# Patient Record
Sex: MALE | Race: WHITE | NOT HISPANIC OR LATINO
[De-identification: names, ages, dates, MRNs, and addresses within clinical notes are randomized per-mention and may not be internally consistent; named-entity substitution may affect disease eponyms.]

---

## 2018-01-19 PROBLEM — Z00.00 ENCOUNTER FOR PREVENTIVE HEALTH EXAMINATION: Status: ACTIVE | Noted: 2018-01-19

## 2018-01-22 ENCOUNTER — APPOINTMENT (OUTPATIENT)
Dept: PULMONOLOGY | Facility: CLINIC | Age: 78
End: 2018-01-22
Payer: MEDICARE

## 2018-01-22 VITALS
TEMPERATURE: 98.7 F | RESPIRATION RATE: 16 BRPM | HEART RATE: 86 BPM | BODY MASS INDEX: 32.89 KG/M2 | WEIGHT: 217 LBS | OXYGEN SATURATION: 90 % | HEIGHT: 68 IN | SYSTOLIC BLOOD PRESSURE: 164 MMHG | DIASTOLIC BLOOD PRESSURE: 62 MMHG

## 2018-01-22 PROCEDURE — 94060 EVALUATION OF WHEEZING: CPT

## 2018-01-22 PROCEDURE — 99204 OFFICE O/P NEW MOD 45 MIN: CPT | Mod: 25

## 2018-01-22 PROCEDURE — 94729 DIFFUSING CAPACITY: CPT

## 2018-01-22 PROCEDURE — 94727 GAS DIL/WSHOT DETER LNG VOL: CPT

## 2018-01-25 ENCOUNTER — FORM ENCOUNTER (OUTPATIENT)
Age: 78
End: 2018-01-25

## 2018-01-26 ENCOUNTER — OUTPATIENT (OUTPATIENT)
Dept: OUTPATIENT SERVICES | Facility: HOSPITAL | Age: 78
LOS: 1 days | End: 2018-01-26
Payer: MEDICARE

## 2018-01-26 ENCOUNTER — APPOINTMENT (OUTPATIENT)
Dept: RADIOLOGY | Facility: CLINIC | Age: 78
End: 2018-01-26
Payer: MEDICARE

## 2018-01-26 DIAGNOSIS — R06.02 SHORTNESS OF BREATH: ICD-10-CM

## 2018-01-26 DIAGNOSIS — Z00.8 ENCOUNTER FOR OTHER GENERAL EXAMINATION: ICD-10-CM

## 2018-01-26 PROCEDURE — 71046 X-RAY EXAM CHEST 2 VIEWS: CPT

## 2018-01-26 PROCEDURE — 71046 X-RAY EXAM CHEST 2 VIEWS: CPT | Mod: 26

## 2018-02-26 ENCOUNTER — APPOINTMENT (OUTPATIENT)
Dept: PULMONOLOGY | Facility: CLINIC | Age: 78
End: 2018-02-26
Payer: MEDICARE

## 2018-02-26 VITALS
TEMPERATURE: 97.7 F | BODY MASS INDEX: 33.04 KG/M2 | OXYGEN SATURATION: 89 % | RESPIRATION RATE: 16 BRPM | DIASTOLIC BLOOD PRESSURE: 58 MMHG | WEIGHT: 218 LBS | HEIGHT: 68 IN | HEART RATE: 75 BPM | SYSTOLIC BLOOD PRESSURE: 142 MMHG

## 2018-02-26 PROCEDURE — 99214 OFFICE O/P EST MOD 30 MIN: CPT

## 2018-02-26 RX ORDER — GLIMEPIRIDE 4 MG/1
4 TABLET ORAL
Refills: 0 | Status: ACTIVE | COMMUNITY

## 2018-02-26 RX ORDER — FENOFIBRATE 145 MG/1
145 TABLET, COATED ORAL
Refills: 0 | Status: ACTIVE | COMMUNITY

## 2018-02-26 RX ORDER — METFORMIN HYDROCHLORIDE 500 MG/1
500 TABLET, COATED ORAL
Refills: 0 | Status: ACTIVE | COMMUNITY

## 2018-02-26 RX ORDER — PIOGLITAZONE HYDROCHLORIDE 30 MG/1
30 TABLET ORAL
Refills: 0 | Status: ACTIVE | COMMUNITY

## 2018-02-26 RX ORDER — ALBUTEROL SULFATE 90 UG/1
108 (90 BASE) POWDER, METERED RESPIRATORY (INHALATION)
Refills: 0 | Status: ACTIVE | COMMUNITY

## 2018-02-26 RX ORDER — METOPROLOL SUCCINATE 50 MG/1
50 TABLET, EXTENDED RELEASE ORAL
Refills: 0 | Status: ACTIVE | COMMUNITY

## 2018-07-30 ENCOUNTER — APPOINTMENT (OUTPATIENT)
Dept: PULMONOLOGY | Facility: CLINIC | Age: 78
End: 2018-07-30
Payer: MEDICARE

## 2018-07-30 VITALS
TEMPERATURE: 97.8 F | OXYGEN SATURATION: 83 % | SYSTOLIC BLOOD PRESSURE: 160 MMHG | BODY MASS INDEX: 32.54 KG/M2 | DIASTOLIC BLOOD PRESSURE: 60 MMHG | HEART RATE: 85 BPM | WEIGHT: 214 LBS

## 2018-07-30 VITALS — OXYGEN SATURATION: 92 %

## 2018-07-30 DIAGNOSIS — Z87.891 PERSONAL HISTORY OF NICOTINE DEPENDENCE: ICD-10-CM

## 2018-07-30 DIAGNOSIS — Z86.79 PERSONAL HISTORY OF OTHER DISEASES OF THE CIRCULATORY SYSTEM: ICD-10-CM

## 2018-07-30 DIAGNOSIS — Z86.39 PERSONAL HISTORY OF OTHER ENDOCRINE, NUTRITIONAL AND METABOLIC DISEASE: ICD-10-CM

## 2018-07-30 PROCEDURE — 94727 GAS DIL/WSHOT DETER LNG VOL: CPT

## 2018-07-30 PROCEDURE — 94729 DIFFUSING CAPACITY: CPT

## 2018-07-30 PROCEDURE — 99214 OFFICE O/P EST MOD 30 MIN: CPT | Mod: 25

## 2018-07-30 PROCEDURE — 94060 EVALUATION OF WHEEZING: CPT

## 2018-07-30 RX ORDER — FOLIC ACID 1 MG/1
1 TABLET ORAL
Refills: 0 | Status: ACTIVE | COMMUNITY

## 2018-07-30 RX ORDER — AMLODIPINE BESYLATE 5 MG/1
TABLET ORAL
Refills: 0 | Status: ACTIVE | COMMUNITY

## 2018-07-30 RX ORDER — LISINOPRIL 20 MG/1
20 TABLET ORAL
Refills: 0 | Status: COMPLETED | COMMUNITY
End: 2018-07-30

## 2018-10-29 ENCOUNTER — APPOINTMENT (OUTPATIENT)
Dept: PULMONOLOGY | Facility: CLINIC | Age: 78
End: 2018-10-29
Payer: MEDICARE

## 2018-10-29 VITALS
TEMPERATURE: 98.2 F | WEIGHT: 215 LBS | BODY MASS INDEX: 32.69 KG/M2 | OXYGEN SATURATION: 78 % | SYSTOLIC BLOOD PRESSURE: 168 MMHG | HEART RATE: 87 BPM | DIASTOLIC BLOOD PRESSURE: 68 MMHG

## 2018-10-29 VITALS — OXYGEN SATURATION: 90 % | HEART RATE: 79 BPM

## 2018-10-29 PROCEDURE — 99215 OFFICE O/P EST HI 40 MIN: CPT

## 2018-11-04 ENCOUNTER — FORM ENCOUNTER (OUTPATIENT)
Age: 78
End: 2018-11-04

## 2018-11-05 ENCOUNTER — OUTPATIENT (OUTPATIENT)
Dept: OUTPATIENT SERVICES | Facility: HOSPITAL | Age: 78
LOS: 1 days | End: 2018-11-05
Payer: MEDICARE

## 2018-11-05 ENCOUNTER — APPOINTMENT (OUTPATIENT)
Dept: NUCLEAR MEDICINE | Facility: HOSPITAL | Age: 78
End: 2018-11-05
Payer: MEDICARE

## 2018-11-05 DIAGNOSIS — R06.02 SHORTNESS OF BREATH: ICD-10-CM

## 2018-11-05 DIAGNOSIS — R09.02 HYPOXEMIA: ICD-10-CM

## 2018-11-05 PROCEDURE — 78582 LUNG VENTILAT&PERFUS IMAGING: CPT

## 2018-11-05 PROCEDURE — 78582 LUNG VENTILAT&PERFUS IMAGING: CPT | Mod: 26

## 2018-11-05 PROCEDURE — A9567: CPT

## 2018-11-05 PROCEDURE — A9540: CPT

## 2018-11-05 PROCEDURE — 71046 X-RAY EXAM CHEST 2 VIEWS: CPT | Mod: 26

## 2018-11-05 PROCEDURE — 71046 X-RAY EXAM CHEST 2 VIEWS: CPT

## 2018-11-14 ENCOUNTER — OUTPATIENT (OUTPATIENT)
Dept: OUTPATIENT SERVICES | Facility: HOSPITAL | Age: 78
LOS: 1 days | Discharge: HOME | End: 2018-11-14

## 2018-11-14 DIAGNOSIS — D64.9 ANEMIA, UNSPECIFIED: ICD-10-CM

## 2018-11-19 ENCOUNTER — APPOINTMENT (OUTPATIENT)
Dept: HEMATOLOGY ONCOLOGY | Facility: CLINIC | Age: 78
End: 2018-11-19
Payer: MEDICARE

## 2018-11-19 VITALS
BODY MASS INDEX: 33.37 KG/M2 | TEMPERATURE: 97.6 F | OXYGEN SATURATION: 90 % | SYSTOLIC BLOOD PRESSURE: 175 MMHG | HEIGHT: 67.48 IN | RESPIRATION RATE: 16 BRPM | DIASTOLIC BLOOD PRESSURE: 72 MMHG | WEIGHT: 215.15 LBS | HEART RATE: 86 BPM

## 2018-11-19 PROCEDURE — 99204 OFFICE O/P NEW MOD 45 MIN: CPT

## 2018-11-19 RX ORDER — MAGNESIUM OXIDE 400 MG
1000 CAPSULE ORAL DAILY
Qty: 30 | Refills: 1 | Status: ACTIVE | COMMUNITY
Start: 2018-11-19 | End: 1900-01-01

## 2018-11-19 NOTE — CONSULT LETTER
[Dear  ___] : Dear  [unfilled], [Consult Letter:] : I had the pleasure of evaluating your patient, [unfilled]. [Please see my note below.] : Please see my note below. [Consult Closing:] : Thank you very much for allowing me to participate in the care of this patient.  If you have any questions, please do not hesitate to contact me. [Sincerely,] : Sincerely, [FreeTextEntry3] : Florencia Flores MD. MS. \par Hematologist/Oncologist\par [facility]\par ph. [phone]\par fx. [phone]\par

## 2018-11-19 NOTE — PHYSICAL EXAM
[Restricted in physically strenuous activity but ambulatory and able to carry out work of a light or sedentary nature] : Status 1- Restricted in physically strenuous activity but ambulatory and able to carry out work of a light or sedentary nature, e.g., light house work, office work [Obese] : obese [Normal] : normal spine exam without palpable tenderness, no kyphosis or scoliosis [de-identified] : soft obese abdomen, no palpable masses.

## 2018-11-19 NOTE — RESULTS/DATA
[FreeTextEntry1] : Labs done 10/23/2018\par Hb 12.1, mcv 101, smear report - hyposegmented neutrophils, wbc 11.2, diff c/w 37% neutrophils, 5% myelocytes, 3% metamyelocytes, ANC 4.16

## 2018-11-19 NOTE — HISTORY OF PRESENT ILLNESS
[de-identified] : 76 yo male w/ hx of diabetes (type2), htn, high cholesterol, CAD s/p ACBG in 1999, COPD on home oxygen referred for macrocytic anemia noted on labs. Pt had incidental finding of hyposegmented neutrophils which was seen on smear of peripheral blood. He was referred for hematology evaluation to r/o MDS. Pt reports no new fatigue or acute changes in baseline functional status. He denies any recent bleeding or easy bruising. No new medication changes.

## 2018-11-19 NOTE — ASSESSMENT
[FreeTextEntry1] : Mild anemia - Hb 12g\par Diff percent was abnl but actual wbc diff #s were wnl\par ANC and ALC were wnl\par occasional hyposegmented neutrophils seen - nonspecific \par DDx includes med effect (metformin?) vs marrow dysplasia.

## 2018-11-19 NOTE — REVIEW OF SYSTEMS
[Negative] : Allergic/Immunologic [Shortness Of Breath] : no shortness of breath [Wheezing] : no wheezing [Cough] : no cough [SOB on Exertion] : shortness of breath during exertion

## 2018-11-26 ENCOUNTER — RESULT REVIEW (OUTPATIENT)
Age: 78
End: 2018-11-26

## 2018-11-26 ENCOUNTER — APPOINTMENT (OUTPATIENT)
Dept: HEMATOLOGY ONCOLOGY | Facility: CLINIC | Age: 78
End: 2018-11-26

## 2018-11-26 ENCOUNTER — OUTPATIENT (OUTPATIENT)
Dept: OUTPATIENT SERVICES | Facility: HOSPITAL | Age: 78
LOS: 1 days | End: 2018-11-26
Payer: MEDICARE

## 2018-11-26 DIAGNOSIS — D64.9 ANEMIA, UNSPECIFIED: ICD-10-CM

## 2018-11-26 LAB
BASOPHILS # BLD AUTO: 0.1 K/UL — SIGNIFICANT CHANGE UP (ref 0–0.2)
BASOPHILS NFR BLD AUTO: 0.5 % — SIGNIFICANT CHANGE UP (ref 0–2)
EOSINOPHIL # BLD AUTO: 0.1 K/UL — SIGNIFICANT CHANGE UP (ref 0–0.5)
EOSINOPHIL NFR BLD AUTO: 0.8 % — SIGNIFICANT CHANGE UP (ref 0–6)
HCT VFR BLD CALC: 36.7 % — LOW (ref 39–50)
HGB BLD-MCNC: 11.7 G/DL — LOW (ref 13–17)
LYMPHOCYTES # BLD AUTO: 23.2 % — SIGNIFICANT CHANGE UP (ref 13–44)
LYMPHOCYTES # BLD AUTO: 3.1 K/UL — SIGNIFICANT CHANGE UP (ref 1–3.3)
MCHC RBC-ENTMCNC: 31.9 G/DL — LOW (ref 32–36)
MCHC RBC-ENTMCNC: 33.2 PG — SIGNIFICANT CHANGE UP (ref 27–34)
MCV RBC AUTO: 104 FL — HIGH (ref 80–100)
MONOCYTES # BLD AUTO: 1.5 K/UL — HIGH (ref 0–0.9)
MONOCYTES NFR BLD AUTO: 11.2 % — SIGNIFICANT CHANGE UP (ref 2–14)
NEUTROPHILS # BLD AUTO: 8.5 K/UL — HIGH (ref 1.8–7.4)
NEUTROPHILS NFR BLD AUTO: 64.3 % — SIGNIFICANT CHANGE UP (ref 43–77)
PLATELET # BLD AUTO: 369 K/UL — SIGNIFICANT CHANGE UP (ref 150–400)
RBC # BLD: 3.52 M/UL — LOW (ref 4.2–5.8)
RBC # FLD: 24.1 % — HIGH (ref 10.3–14.5)
WBC # BLD: 13.2 K/UL — HIGH (ref 3.8–10.5)
WBC # FLD AUTO: 13.2 K/UL — HIGH (ref 3.8–10.5)

## 2018-11-27 LAB
ALBUMIN SERPL ELPH-MCNC: 4.1 G/DL
ALP BLD-CCNC: 58 U/L
ALT SERPL-CCNC: 9 U/L
ANION GAP SERPL CALC-SCNC: 10 MMOL/L
AST SERPL-CCNC: 16 U/L
BILIRUB SERPL-MCNC: 0.4 MG/DL
BUN SERPL-MCNC: 21 MG/DL
CALCIUM SERPL-MCNC: 9.3 MG/DL
CHLORIDE SERPL-SCNC: 104 MMOL/L
CO2 SERPL-SCNC: 24 MMOL/L
CREAT SERPL-MCNC: 1.3 MG/DL
GLUCOSE SERPL-MCNC: 114 MG/DL
POTASSIUM SERPL-SCNC: 5.5 MMOL/L
PROT SERPL-MCNC: 7.3 G/DL
SODIUM SERPL-SCNC: 138 MMOL/L
VIT B12 SERPL-MCNC: 1021 PG/ML

## 2018-11-28 LAB — TM INTERPRETATION: SIGNIFICANT CHANGE UP

## 2018-11-29 LAB — METHYLMALONATE SERPL-SCNC: 257 NMOL/L

## 2018-12-07 ENCOUNTER — APPOINTMENT (OUTPATIENT)
Dept: PULMONOLOGY | Facility: CLINIC | Age: 78
End: 2018-12-07
Payer: MEDICARE

## 2018-12-07 VITALS
HEART RATE: 82 BPM | RESPIRATION RATE: 18 BRPM | BODY MASS INDEX: 33.35 KG/M2 | WEIGHT: 216 LBS | DIASTOLIC BLOOD PRESSURE: 70 MMHG | OXYGEN SATURATION: 90 % | SYSTOLIC BLOOD PRESSURE: 140 MMHG

## 2018-12-07 VITALS — OXYGEN SATURATION: 92 %

## 2018-12-07 DIAGNOSIS — Z99.89 DEPENDENCE ON OTHER ENABLING MACHINES AND DEVICES: ICD-10-CM

## 2018-12-07 PROCEDURE — 99214 OFFICE O/P EST MOD 30 MIN: CPT

## 2018-12-13 ENCOUNTER — APPOINTMENT (OUTPATIENT)
Dept: HEMATOLOGY ONCOLOGY | Facility: CLINIC | Age: 78
End: 2018-12-13
Payer: MEDICARE

## 2018-12-13 ENCOUNTER — RESULT REVIEW (OUTPATIENT)
Age: 78
End: 2018-12-13

## 2018-12-13 ENCOUNTER — OUTPATIENT (OUTPATIENT)
Dept: OUTPATIENT SERVICES | Facility: HOSPITAL | Age: 78
LOS: 1 days | End: 2018-12-13
Payer: MEDICARE

## 2018-12-13 VITALS
DIASTOLIC BLOOD PRESSURE: 74 MMHG | TEMPERATURE: 97.6 F | OXYGEN SATURATION: 87 % | WEIGHT: 217.15 LBS | SYSTOLIC BLOOD PRESSURE: 154 MMHG | RESPIRATION RATE: 16 BRPM | HEART RATE: 63 BPM | BODY MASS INDEX: 33.53 KG/M2

## 2018-12-13 VITALS — OXYGEN SATURATION: 94 %

## 2018-12-13 PROCEDURE — 81121 IDH2 COMMON VARIANTS: CPT

## 2018-12-13 PROCEDURE — 81275 KRAS GENE VARIANTS EXON 2: CPT

## 2018-12-13 PROCEDURE — 85097 BONE MARROW INTERPRETATION: CPT

## 2018-12-13 PROCEDURE — 81272 KIT GENE TARGETED SEQ ANALYS: CPT

## 2018-12-13 PROCEDURE — 81270 JAK2 GENE: CPT

## 2018-12-13 PROCEDURE — 81219 CALR GENE COM VARIANTS: CPT

## 2018-12-13 PROCEDURE — 81310 NPM1 GENE: CPT

## 2018-12-13 PROCEDURE — 81405 MOPATH PROCEDURE LEVEL 6: CPT

## 2018-12-13 PROCEDURE — 81403 MOPATH PROCEDURE LEVEL 4: CPT

## 2018-12-13 PROCEDURE — 88264 CHROMOSOME ANALYSIS 20-25: CPT

## 2018-12-13 PROCEDURE — 87205 SMEAR GRAM STAIN: CPT

## 2018-12-13 PROCEDURE — 88280 CHROMOSOME KARYOTYPE STUDY: CPT

## 2018-12-13 PROCEDURE — 88275 CYTOGENETICS 100-300: CPT

## 2018-12-13 PROCEDURE — 88237 TISSUE CULTURE BONE MARROW: CPT

## 2018-12-13 PROCEDURE — 88313 SPECIAL STAINS GROUP 2: CPT | Mod: 26

## 2018-12-13 PROCEDURE — 81245 FLT3 GENE: CPT

## 2018-12-13 PROCEDURE — 81246 FLT3 GENE ANALYSIS: CPT

## 2018-12-13 PROCEDURE — 81120 IDH1 COMMON VARIANTS: CPT

## 2018-12-13 PROCEDURE — 88185 FLOWCYTOMETRY/TC ADD-ON: CPT

## 2018-12-13 PROCEDURE — 38222 DX BONE MARROW BX & ASPIR: CPT | Mod: RT

## 2018-12-13 PROCEDURE — 81402 MOPATH PROCEDURE LEVEL 3: CPT

## 2018-12-13 PROCEDURE — 88313 SPECIAL STAINS GROUP 2: CPT

## 2018-12-13 PROCEDURE — 81210 BRAF GENE: CPT

## 2018-12-13 PROCEDURE — 81170 ABL1 GENE: CPT

## 2018-12-13 PROCEDURE — 88189 FLOWCYTOMETRY/READ 16 & >: CPT

## 2018-12-13 PROCEDURE — 88271 CYTOGENETICS DNA PROBE: CPT

## 2018-12-13 PROCEDURE — 81311 NRAS GENE VARIANTS EXON 2&3: CPT

## 2018-12-13 PROCEDURE — 88305 TISSUE EXAM BY PATHOLOGIST: CPT | Mod: 26

## 2018-12-13 PROCEDURE — 81276 KRAS GENE ADDL VARIANTS: CPT

## 2018-12-13 PROCEDURE — 88305 TISSUE EXAM BY PATHOLOGIST: CPT

## 2018-12-13 PROCEDURE — 88184 FLOWCYTOMETRY/ TC 1 MARKER: CPT

## 2018-12-14 NOTE — REASON FOR VISIT
[Bone Marrow Biopsy] : bone marrow biopsy [Bone Marrow Aspiration] : bone marrow aspiration [FreeTextEntry2] : 78 yrs old male with macrocytic anemia, hyposegmented  neutrophils, his  flow cytometry showed atypical cells, r/o CML or myeloid dz

## 2018-12-14 NOTE — PROCEDURE
[Bone Marrow Biopsy] : bone marrow biopsy [Bone Marrow Aspiration] : bone marrow aspiration  [Patient] : the patient [Verbal Consent Obtained] : verbal consent was obtained prior to the procedure [Patient identification verified] : patient identification verified [Procedure verified and consent obtained] : procedure verified and consent obtained [Laterality verified and correct site marked] : laterality verified and correct site marked [Right] : site: right [Correct positioning] : correct positioning [Correct implant and/ or special equipment obtained] : correct impact and/ or special equipment obtained [Prone] : prone [Superior iliac spine was identified] : the superior iliac spine was identified. [The right posterior iliac crest was prepped with betadine and draped, using sterile technique.] : The right posterior iliac crest was prepped with betadine and draped, using sterile technique. [Lidocaine was injected and into the periosteum overlying the site.] : Lidocaine was injected and into the periosteum overlying the site. [Aspirate] : aspirate [Cytogenetics] : cytogenetics [FISH] : FISH [Biopsy] : biopsy [Flow Cytometry] : flow cytometry [] : The patient was instructed to remove the bandage the following AM. The patient may bathe. Acetaminophen may be taken for discomfort, as per package directions.If there are any other problems, the patient was instructed to call the office. The patient verbalized understanding, and is aware of the office contact numbers. [FreeTextEntry1] : 78 yrs old male with macrocytic anemia, hyposegmented  neutrophils, his  flow cytometry showed atypical cells, r/o CML, and myeloid disease  [FreeTextEntry2] : Pt's O2 sat started at 87%. Repeat sat at 92 % at the time of discharge. Due to pt's breathing and difficulty staying flat on his abdomen, procedure  needed to be terminated after one attempt. pt is aware he might need to repeat the procedure if needed.

## 2018-12-17 ENCOUNTER — OUTPATIENT (OUTPATIENT)
Dept: OUTPATIENT SERVICES | Facility: HOSPITAL | Age: 78
LOS: 1 days | Discharge: HOME | End: 2018-12-17

## 2018-12-17 DIAGNOSIS — D64.9 ANEMIA, UNSPECIFIED: ICD-10-CM

## 2018-12-17 LAB — TM INTERPRETATION: SIGNIFICANT CHANGE UP

## 2018-12-18 LAB — CHROM ANALY OVERALL INTERP SPEC-IMP: SIGNIFICANT CHANGE UP

## 2018-12-19 LAB — HEMATOPATHOLOGY REPORT: SIGNIFICANT CHANGE UP

## 2018-12-21 ENCOUNTER — APPOINTMENT (OUTPATIENT)
Dept: HEMATOLOGY ONCOLOGY | Facility: CLINIC | Age: 78
End: 2018-12-21
Payer: MEDICARE

## 2018-12-21 VITALS
SYSTOLIC BLOOD PRESSURE: 184 MMHG | TEMPERATURE: 97.7 F | OXYGEN SATURATION: 87 % | HEART RATE: 73 BPM | WEIGHT: 217.99 LBS | RESPIRATION RATE: 16 BRPM | DIASTOLIC BLOOD PRESSURE: 79 MMHG | BODY MASS INDEX: 33.66 KG/M2

## 2018-12-21 PROCEDURE — 99215 OFFICE O/P EST HI 40 MIN: CPT

## 2018-12-21 NOTE — RESULTS/DATA
[FreeTextEntry1] : \par  Pathology             Final\par \par No Documents Attached\par \par \par \par \par   TAMMY CAN                        3\par \par \par \par Hematopathology Report\par \par \par \par \par Final Diagnosis\par 1, 2. Bone marrow biopsy, bone marrow clot, and bone marrow\par aspirate\par - Mild anemia with ring sideroblasts, dysmyelopoiesis,\par myeloid left shift, and mild monocytosis (minimal evaluable bone\par marrow)\par - Correlation with myeloid genomic panel pending\par \par See note and description.\par \par Diagnostic note: The bone marrow biopsy consists of cortical bone\par and the bone marrow aspirate is hemodilute.  Scant bone marrow\par elements fragments in clot are present.  In this limited\par evaluation features of atypical chronic myeloid leukemia, MDS\par with multilineage dysplasia and ring sideroblasts, and chronic\par myelomonocytic leukemia are present, including dysmyelopoiesis,\par anemia with increased ring sideroblasts (dyserythropoiesis),\par myeloid left shift (11%), and mild monocytosis (less than 2\par months duration). Classification is difficult with the presence\par of multilineage dysplasia , mild anemia (hemoglobin of 11.7g/dL,\par greater than the usual criteria of less than 10 g/dL), myeloid\par left shift, and short duration of monocytosis (11%, >= 1.0 K/uL).\par Followup and correlation with somatic myeloid mutation panel may\par be helpful.\par \par Comprehensive report with results of pending ancillary studies to\par follow.\par \par Dr. Flores was notified of the diagnosis on 12/19/18.\par \par Ancillary studies\par Bone marrow aspirate iron stain: Iron stores cannot be evaluated\par due to lack of spicules; numerous ring sideroblasts are present\par (greater than 50%).\par \par Flow cytometry:  The myeloid immunophenotypic findings show\par decreased myeloid granularity, no increase in myeloid immaturity,\par no monocytosis with partial loss of CD14, and normal myeloid\par antigen maturation pattern.\par \par Cytogenetics: 46,XY[20]\par \par Microscopic description:\par 1. Biopsy/clot:  Sections of bone marrow biopsy show cortical\par bone without marrow elements.  Scant sections of bone marrow\par fragments in clot are evaluated.  Cellularity cannot be\par accurately assessed.  There is trilineage\par \par \par \par \par \par \par TAMMY CAN                        3\par \par \par \par Hematopathology Report\par \par \par \par \par hematopoiesis with maturation, decreased M:E ratio (erythroid\par predominant), megakaryocytes are insufficient in number to\par characterize morphology, and iron stores increased.\par \par 2. Aspirate:  Cellular spicules are not present, however review\par of the smear is adequate for interpretation and differential (M:E\par ratio may not be valid due to some hemodilution).  Maturing and\par mature myeloid and erythroid elements are present with M:E ratio\par of 3.9:1, which may not be valid due to hemodilution. Myeloid\par elements show dysmyelopoieis with hypolobulation. Erythroid\par elements show mild dyserythropoiesis.  Megakaryocytes are not\par seen.\par \par Bone Marrow Aspirate Differential: (200 Cells).\par Type            %    Normal*\par Blast                0%   0-3\par Neutrophil and\par Precursors        78%  33-63\par Eosinophil           1%   1-5\par Basophil        0%   0-1\par Pronormoblast        1%   0-2\par Normoblast           18%  15-25\par Monocyte        0%   0-2\par Lymphocyte           2%   10-15\par Plasma cell          0%   0-1\par *Adult Range (may not be valid due to\par hemodilution)\par \par Comment\par Iron stain (examined to evaluate for iron stores; see microscopic\par description) and Giemsa stain (shows appropriate\par staining pattern) are performed and evaluated on block(s): 1A,\par 1B.\par \par Verified by: Rita Lubin\par (Electronic Signature)\par Reported on: 12/19/18 11:06 EST, [address] [place] [address]\par [id number]\par _________________________________________________________________\par \par Clinical History\par 78 years old male with macrocytic anemia, hyposegmented\par neutrophils, his flow cytometry showed atypical cells; r/o\par CML/myeloid disease\par \par Specimen(s) Submitted\par 1     Bone marrow biopsy\par 2     Bone marrow aspirate\par \par \par \par \par \par TAMMY CAN                        3\par \par \par \par Hematopathology Report\par \par \par \par \par \par Gross Description\par 1. The specimen is received in bouin's fixative and the specimen\par container is labeled: Bone marrow biopsy.  It consists of a 0.3 x\par 0.2 x 0.1 cm possible bone marrow core with a 2.0 x 1.3 x 0.6 cm\par aggregate of blood clot.  Entirely submitted.  Two cassettes: A =\par possible bone marrow core; B = blood clot.\par \par 2. Two Hodgson-Giemsa and one iron stained bone marrow aspirate\par smears are submitted [[id number]; ].\par \par In addition to other data that may appear on the specimen\par container, the label has been inspected to confirm the presence\par of the patient's name and date of birth.\par Maci Misael/12/13/18 14:44\par \par  \par \par  Ordered by: CROW FLORES       Collected/Examined: [id number] 01:58PM       \par Verified by: CROW FLORES [id number] 01:20PM       \par  Result Communication: No patient communication needed at this time;\par Stage: Final       \par  Performed at: [facility]       Resulted: [id number] 11:06AM       Last Updated: [id number] 01:20PM       Accession: [id number]    \par \par  HLX CG (CONST) Final Report             Final\par \par No Documents Attached\par \par \par   Test   Result   Flag Reference Goal \par   Chromosome Analysis Final Report Chromosome Analysis (ONC) Report     New \par   _______________________________________________________________\par Accession Number: [id number]\par Indication: Monocytic anemia, atypical cell on Flow\par Date Collected: 12/13/2018 8:11\par Date Received: 12/13/2018 13:46\par Date Reported: 12/18/2018 13:11\par Specimen Type: Bone Marrow\par Test Requested: Chromosome Analysis\par ________________________________________________________________             Metaphases Counted:       20\par Culture Duration:         24 and 48 hours                                      Metaphases Analyzed:      20\par Number of Cultures:       2\par Metaphases Karyotyped:    4\par Banding Technique:        GTG\par Band Resolution:          300 - 400\par Preparation Quality:      Adequate\par ________________________________________________________________\par Result: Normal male karyotype\par \par Karyotype: 46,XY[20]\par ________________________________________________________________\par Findings and Interpretation:\par No consistent numerical or structural chromosome abnormalities were observed in the cells analyzed.\par \par Disclaimer:\par Routine chromosome analysis may not detect subtle structural rearrangements within the limits of cytogenetic technology utilized in this study.\par \par Preliminary Report:\par No\par \par Pathologist: Alejandro Cantu MD\par \par Verified By: Cytogeneticist : Cortney Dumas, PhD, [facility]\par (Electronic Signature)\par \par  \par \par  Ordered by: CROW FLORES       Collected/Examined: [id number] 01:46PM       \par Verified by: CROW FLORES [id number] 02:07PM       \par  Result Communication: No patient communication needed at this time;\par Stage: Final       \par  Performed at: [facility] (Med Director: Brian Watts M.D)       Resulted: [id number] 01:11PM       Last Updated: [id number] 02:07PM       Accession: [id number]       \par

## 2018-12-21 NOTE — PHYSICAL EXAM
[Restricted in physically strenuous activity but ambulatory and able to carry out work of a light or sedentary nature] : Status 1- Restricted in physically strenuous activity but ambulatory and able to carry out work of a light or sedentary nature, e.g., light house work, office work [Obese] : obese [Normal] : affect appropriate [de-identified] : chronic post surgical changes to chest wall - well healed [de-identified] : soft obese abdomen, no palpable masses.

## 2018-12-21 NOTE — ASSESSMENT
[FreeTextEntry1] : BMbx 12/13/2018 - c/w MDS/MPN - mixed features. Low blast ct <5%, c/w low grade dz\par Await genomic panel\par ?MDS-RARS vs atypical CML vs CMMoL\par

## 2018-12-21 NOTE — REASON FOR VISIT
[Follow-Up Visit] : a follow-up visit for [Myeloproliferative Disorder] : myeloproliferative disorder [FreeTextEntry2] : possible MDS

## 2018-12-21 NOTE — CONSULT LETTER
[Dear  ___] : Dear  [unfilled], [Consult Letter:] : I had the pleasure of evaluating your patient, [unfilled]. [Please see my note below.] : Please see my note below. [Consult Closing:] : Thank you very much for allowing me to participate in the care of this patient.  If you have any questions, please do not hesitate to contact me. [Sincerely,] : Sincerely, [FreeTextEntry3] : Florencia Flores MD. MS. \par Hematologist/Oncologist\par [facility]\par ph. [phone]\par fx. [phone]\par

## 2018-12-21 NOTE — REVIEW OF SYSTEMS
[Negative] : Allergic/Immunologic [Fever] : no fever [Chills] : no chills [Night Sweats] : no night sweats [Fatigue] : fatigue [Recent Change In Weight] : ~T no recent weight change [Shortness Of Breath] : no shortness of breath [Wheezing] : no wheezing [Cough] : no cough [SOB on Exertion] : shortness of breath during exertion [FreeTextEntry2] : chronic fatigue w/ exercise

## 2018-12-24 LAB — CHROM ANALY INTERPHASE BLD FISH-IMP: SIGNIFICANT CHANGE UP

## 2018-12-27 LAB
BLUEBERRY IGG RAST: (no result)
BROCCOLI IGG RAST: SIGNIFICANT CHANGE UP
CABBAGE IGG RAST: SIGNIFICANT CHANGE UP
CARDAMON IGE RAST: SIGNIFICANT CHANGE UP
CARP IGE RAST: SIGNIFICANT CHANGE UP
CARROT IGG RAST: SIGNIFICANT CHANGE UP
CASEIN IGG RAST: SIGNIFICANT CHANGE UP
CAULIFLOWER IGG RAST: SIGNIFICANT CHANGE UP
MYE REFERENCES 2: SIGNIFICANT CHANGE UP
ONKOSIGHT MYELOID SEQUENCE: (no result)

## 2019-01-31 DIAGNOSIS — D47.1 CHRONIC MYELOPROLIFERATIVE DISEASE: ICD-10-CM

## 2019-01-31 DIAGNOSIS — D46.9 MYELODYSPLASTIC SYNDROME, UNSPECIFIED: ICD-10-CM

## 2019-02-19 ENCOUNTER — OUTPATIENT (OUTPATIENT)
Dept: OUTPATIENT SERVICES | Facility: HOSPITAL | Age: 79
LOS: 1 days | Discharge: HOME | End: 2019-02-19

## 2019-02-19 DIAGNOSIS — D64.9 ANEMIA, UNSPECIFIED: ICD-10-CM

## 2019-02-25 ENCOUNTER — RESULT REVIEW (OUTPATIENT)
Age: 79
End: 2019-02-25

## 2019-02-25 ENCOUNTER — APPOINTMENT (OUTPATIENT)
Dept: HEMATOLOGY ONCOLOGY | Facility: CLINIC | Age: 79
End: 2019-02-25
Payer: MEDICARE

## 2019-02-25 VITALS
OXYGEN SATURATION: 93 % | SYSTOLIC BLOOD PRESSURE: 183 MMHG | BODY MASS INDEX: 33.19 KG/M2 | RESPIRATION RATE: 18 BRPM | DIASTOLIC BLOOD PRESSURE: 73 MMHG | TEMPERATURE: 97.6 F | HEART RATE: 84 BPM | WEIGHT: 214.95 LBS

## 2019-02-25 DIAGNOSIS — R06.02 SHORTNESS OF BREATH: ICD-10-CM

## 2019-02-25 LAB
BASOPHILS # BLD AUTO: 0.1 K/UL — SIGNIFICANT CHANGE UP (ref 0–0.2)
BASOPHILS NFR BLD AUTO: 1 % — SIGNIFICANT CHANGE UP (ref 0–2)
EOSINOPHIL # BLD AUTO: 0.1 K/UL — SIGNIFICANT CHANGE UP (ref 0–0.5)
EOSINOPHIL NFR BLD AUTO: 1.2 % — SIGNIFICANT CHANGE UP (ref 0–6)
HCT VFR BLD CALC: 33 % — LOW (ref 39–50)
HGB BLD-MCNC: 10.9 G/DL — LOW (ref 13–17)
LYMPHOCYTES # BLD AUTO: 2.4 K/UL — SIGNIFICANT CHANGE UP (ref 1–3.3)
LYMPHOCYTES # BLD AUTO: 21 % — SIGNIFICANT CHANGE UP (ref 13–44)
MCHC RBC-ENTMCNC: 33 G/DL — SIGNIFICANT CHANGE UP (ref 32–36)
MCHC RBC-ENTMCNC: 34.1 PG — HIGH (ref 27–34)
MCV RBC AUTO: 103 FL — HIGH (ref 80–100)
MONOCYTES # BLD AUTO: 1.5 K/UL — HIGH (ref 0–0.9)
MONOCYTES NFR BLD AUTO: 12.8 % — SIGNIFICANT CHANGE UP (ref 2–14)
NEUTROPHILS # BLD AUTO: 7.4 K/UL — SIGNIFICANT CHANGE UP (ref 1.8–7.4)
NEUTROPHILS NFR BLD AUTO: 64 % — SIGNIFICANT CHANGE UP (ref 43–77)
PLATELET # BLD AUTO: 343 K/UL — SIGNIFICANT CHANGE UP (ref 150–400)
RBC # BLD: 3.2 M/UL — LOW (ref 4.2–5.8)
RBC # FLD: 24.2 % — HIGH (ref 10.3–14.5)
WBC # BLD: 11.5 K/UL — HIGH (ref 3.8–10.5)
WBC # FLD AUTO: 11.5 K/UL — HIGH (ref 3.8–10.5)

## 2019-02-25 PROCEDURE — 99214 OFFICE O/P EST MOD 30 MIN: CPT

## 2019-02-25 NOTE — ASSESSMENT
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

## 2019-02-25 NOTE — REASON FOR VISIT
[Follow-Up Visit] : a follow-up visit for [Myelodysplastic syndrome] : myelodysplastic syndrome [Spouse] : spouse [FreeTextEntry2] : low grade MDS-w/ multilineage dysplasia (IPSS-R score =1)

## 2019-02-25 NOTE — REVIEW OF SYSTEMS
[Fever] : no fever [Chills] : no chills [Night Sweats] : no night sweats [Fatigue] : fatigue [Recent Change In Weight] : ~T no recent weight change [Shortness Of Breath] : shortness of breath [Wheezing] : no wheezing [Cough] : no cough [SOB on Exertion] : shortness of breath during exertion [Negative] : Allergic/Immunologic

## 2019-02-25 NOTE — HISTORY OF PRESENT ILLNESS
[de-identified] : Nov 19, 2018\par "76 yo male w/ hx of diabetes (type2), htn, high cholesterol, CAD s/p ACBG in 1999, COPD on home oxygen referred for macrocytic anemia noted on labs. Pt had incidental finding of hyposegmented neutrophils which was seen on smear of peripheral blood. He was referred for hematology evaluation to r/o MDS. Pt reports no new fatigue or acute changes in baseline functional status. He denies any recent bleeding or easy bruising. No new medication changes. " [de-identified] : Dec 21, 2018\franklyn "Pt is here today accompanied with spouse. He reports significant pain with BMbx procedure, voiced reluctance to repeat test in the future. He is here for follow-up of Bmbx results. "\franklyn \frankyln ---------------------------------------------------------------------------------------\franklyn Pt comes in today for scheduled appt, he is accompanied with his wife. He has many questions regarding his poor pulmonary status. He otherwise has no complaints.

## 2019-02-25 NOTE — PHYSICAL EXAM
[Ambulatory and capable of all self care but unable to carry out any work activities] : Status 2- Ambulatory and capable of all self care but unable to carry out any work activities. Up and about more than 50% of waking hours [Obese] : obese [Normal] : affect appropriate [de-identified] : rare scattered dry rales  [de-identified] : chronic post surgical changes to chest wall - well healed [de-identified] : soft obese abdomen, no palpable masses.

## 2019-02-26 LAB
ALBUMIN SERPL ELPH-MCNC: 4.4 G/DL
ALP BLD-CCNC: 52 U/L
ALT SERPL-CCNC: 8 U/L
ANION GAP SERPL CALC-SCNC: 10 MMOL/L
AST SERPL-CCNC: 19 U/L
BILIRUB SERPL-MCNC: 0.4 MG/DL
BUN SERPL-MCNC: 23 MG/DL
CALCIUM SERPL-MCNC: 9.5 MG/DL
CHLORIDE SERPL-SCNC: 105 MMOL/L
CO2 SERPL-SCNC: 24 MMOL/L
CREAT SERPL-MCNC: 1.16 MG/DL
GLUCOSE SERPL-MCNC: 67 MG/DL
HBA1C MFR BLD HPLC: 6 %
POTASSIUM SERPL-SCNC: 5.3 MMOL/L
PROT SERPL-MCNC: 7.6 G/DL
SODIUM SERPL-SCNC: 139 MMOL/L

## 2019-03-13 ENCOUNTER — APPOINTMENT (OUTPATIENT)
Dept: PULMONOLOGY | Facility: CLINIC | Age: 79
End: 2019-03-13
Payer: MEDICARE

## 2019-03-13 VITALS — OXYGEN SATURATION: 92 %

## 2019-03-13 VITALS
RESPIRATION RATE: 20 BRPM | HEART RATE: 76 BPM | WEIGHT: 215 LBS | DIASTOLIC BLOOD PRESSURE: 68 MMHG | SYSTOLIC BLOOD PRESSURE: 164 MMHG | OXYGEN SATURATION: 86 % | TEMPERATURE: 97.9 F | BODY MASS INDEX: 33.2 KG/M2

## 2019-03-13 DIAGNOSIS — G47.30 SLEEP APNEA, UNSPECIFIED: ICD-10-CM

## 2019-03-13 PROCEDURE — 99214 OFFICE O/P EST MOD 30 MIN: CPT

## 2019-03-13 NOTE — HISTORY OF PRESENT ILLNESS
[FreeTextEntry1] : 77 y/o man. Came for a follow up today. He continues to complain of shortness of breath on exertion. He is able to walk about one block and then he has to stop to catch his breath. He does not use his oxygen when walking. He has been advised to do this in the past. No exertional chest pain, pressure or palpitations. No cough and wheezing. No constitutional symptoms.He does not smoke.

## 2019-03-13 NOTE — DISCUSSION/SUMMARY
[FreeTextEntry1] : He is a 78 year-old man with a history of coronary artery disease, status post CABG (1998) complicated by non-union of the sternum, diabetes, hypertension and obstructive sleep apnea.  After his bypass a surgical procedure was necessary to repair the sternum. He says he has been adherent with CPAP therapy.\par \par He has chronic hypoxia and is oxygen dependent. CT of the chest did not demonstrate any interstitial lung disease. A V/Q scan did not indicate chronic thromboembolic pulmonary hypertension. Has has had an echocardiogram but the results were not forwarded to me as advised. Pulmonary hypertension needs to be considered.  \par \par He has mild obstructive airways disease. Anoro was added. He has stopped other inhalers on his own in the past. He was advised to continue with ProAir as needed. \par \par For his sleep apnea he is to continue with CPAP every night. Also uses oxygen at night. \par \par Follow up in three months.

## 2019-03-13 NOTE — PROCEDURE
[FreeTextEntry1] : Pulmonary function testing from 1/22/18 showed mild obstructive airways disease. There was some air trapping. Diffusion was moderately reduced. Some improvement was noted after inhalation of a bronchodilator. \par \par Pulmonary function test July 30, 2018. Mild obstructive airways disease noted. Improvement noted after inhalation of bronchodilator. Air trapping was present. Diffusion is was markedly reduced.\par \par A chest x-ray performed on January 26 2018 reportedly showed slight prominence of interstitial markings with some pulmonary congestion. No pleural effusions were evident.\par \par A CT examination of the chest performed on October 26, 2017 reported multiple small, less than 4 mm, pulmonary nodules which were felt to be stable when compared to the prior study of November 19, 2016. No interstitial abnormalities were reported. There was mild mediastinal adenopathy. A followup examination in 6 months was advised.\par \par A CT of the chest was performed at [facility] on June 6, 2018. It was compared to the prior of October 26, 2017. Multiple small nodules were noted. There was no significant interval change. \par \par A ventilation/perfusion lung scan was obtained on November 5, 2018. It was felt to be very low probability for pulmonary embolism. The distribution for the ventilation component was worse than the perfusion component.

## 2019-03-13 NOTE — REVIEW OF SYSTEMS
[Dyspnea] : dyspnea [Snoring] : snoring [Fever] : no fever [Fatigue] : no fatigue [Nasal Congestion] : no nasal congestion [Cough] : no cough [Hemoptysis] : no hemoptysis [Chest Tightness] : no chest tightness [Pleuritic Pain] : no pleuritic pain [Wheezing] : no wheezing [Chest Discomfort] : no chest discomfort [PND] : no PND [Palpitations] : no palpitations [Edema] : ~T edema was not present [Nasal Discharge] : no nasal discharge [Back Pain] : ~T no back pain [Myalgias] : no myalgias [Rash] : no [unfilled] rash [Headache] : no headache [Depression] : no depression [DVT] : no DVT

## 2019-03-13 NOTE — PHYSICAL EXAM
[Normal Appearance] : normal appearance [General Appearance - In No Acute Distress] : no acute distress [IV] : IV [Neck Cervical Mass (___cm)] : no neck mass was observed [Heart Sounds] : normal S1 and S2 [Murmurs] : no murmurs present [Auscultation Breath Sounds / Voice Sounds] : lungs were clear to auscultation bilaterally [Surgical scars] : surgical scars [Bowel Sounds] : normal bowel sounds [Abdomen Soft] : soft [Abdomen Tenderness] : non-tender [Abnormal Walk] : normal gait [Nail Clubbing] : no clubbing of the fingernails [] : no rash [No Focal Deficits] : no focal deficits [Oriented To Time, Place, And Person] : oriented to person, place, and time [Impaired Insight] : insight and judgment were intact [Erythema] : no erythema of the pharynx [FreeTextEntry1] : obese abdomen

## 2019-05-25 ENCOUNTER — OUTPATIENT (OUTPATIENT)
Dept: OUTPATIENT SERVICES | Facility: HOSPITAL | Age: 79
LOS: 1 days | Discharge: HOME | End: 2019-05-25

## 2019-05-25 DIAGNOSIS — D64.9 ANEMIA, UNSPECIFIED: ICD-10-CM

## 2019-05-29 ENCOUNTER — APPOINTMENT (OUTPATIENT)
Dept: HEMATOLOGY ONCOLOGY | Facility: CLINIC | Age: 79
End: 2019-05-29

## 2019-05-29 ENCOUNTER — APPOINTMENT (OUTPATIENT)
Dept: HEMATOLOGY ONCOLOGY | Facility: CLINIC | Age: 79
End: 2019-05-29
Payer: MEDICARE

## 2019-05-29 ENCOUNTER — RESULT REVIEW (OUTPATIENT)
Age: 79
End: 2019-05-29

## 2019-05-29 VITALS
SYSTOLIC BLOOD PRESSURE: 153 MMHG | BODY MASS INDEX: 34.07 KG/M2 | TEMPERATURE: 97.9 F | DIASTOLIC BLOOD PRESSURE: 71 MMHG | OXYGEN SATURATION: 87 % | RESPIRATION RATE: 24 BRPM | HEART RATE: 67 BPM | WEIGHT: 220.68 LBS

## 2019-05-29 DIAGNOSIS — R09.02 HYPOXEMIA: ICD-10-CM

## 2019-05-29 LAB
BASOPHILS # BLD AUTO: 0.1 K/UL — SIGNIFICANT CHANGE UP (ref 0–0.2)
BASOPHILS NFR BLD AUTO: 0.5 % — SIGNIFICANT CHANGE UP (ref 0–2)
EOSINOPHIL # BLD AUTO: 0.2 K/UL — SIGNIFICANT CHANGE UP (ref 0–0.5)
EOSINOPHIL NFR BLD AUTO: 1.4 % — SIGNIFICANT CHANGE UP (ref 0–6)
HCT VFR BLD CALC: 35.5 % — LOW (ref 39–50)
HGB BLD-MCNC: 11.6 G/DL — LOW (ref 13–17)
LYMPHOCYTES # BLD AUTO: 2.8 K/UL — SIGNIFICANT CHANGE UP (ref 1–3.3)
LYMPHOCYTES # BLD AUTO: 21.7 % — SIGNIFICANT CHANGE UP (ref 13–44)
MCHC RBC-ENTMCNC: 32.6 G/DL — SIGNIFICANT CHANGE UP (ref 32–36)
MCHC RBC-ENTMCNC: 33.3 PG — SIGNIFICANT CHANGE UP (ref 27–34)
MCV RBC AUTO: 102 FL — HIGH (ref 80–100)
MONOCYTES # BLD AUTO: 1.3 K/UL — HIGH (ref 0–0.9)
MONOCYTES NFR BLD AUTO: 9.8 % — SIGNIFICANT CHANGE UP (ref 2–14)
NEUTROPHILS # BLD AUTO: 8.6 K/UL — HIGH (ref 1.8–7.4)
NEUTROPHILS NFR BLD AUTO: 66.6 % — SIGNIFICANT CHANGE UP (ref 43–77)
PLATELET # BLD AUTO: 367 K/UL — SIGNIFICANT CHANGE UP (ref 150–400)
RBC # BLD: 3.47 M/UL — LOW (ref 4.2–5.8)
RBC # FLD: 24.2 % — HIGH (ref 10.3–14.5)
WBC # BLD: 12.9 K/UL — HIGH (ref 3.8–10.5)
WBC # FLD AUTO: 12.9 K/UL — HIGH (ref 3.8–10.5)

## 2019-05-29 PROCEDURE — 99214 OFFICE O/P EST MOD 30 MIN: CPT

## 2019-05-29 NOTE — REVIEW OF SYSTEMS
[Negative] : Allergic/Immunologic [Fever] : no fever [Chills] : no chills [Night Sweats] : no night sweats [Fatigue] : fatigue [Recent Change In Weight] : ~T no recent weight change [Eye Pain] : no eye pain [Red Eyes] : eyes not red [Dry Eyes] : no dryness of the eyes [Vision Problems] : vision problems [Chest Pain] : no chest pain [Palpitations] : no palpitations [Leg Claudication] : no intermittent leg claudication [Lower Ext Edema] : lower extremity edema [Shortness Of Breath] : shortness of breath [Wheezing] : no wheezing [Cough] : no cough [SOB on Exertion] : shortness of breath during exertion [Confused] : no confusion [Dizziness] : no dizziness [Fainting] : no fainting [Difficulty Walking] : difficulty walking

## 2019-05-29 NOTE — RESULTS/DATA
[FreeTextEntry1] : \par  Pathology             Final\par \par No Documents Attached\par \par \par \par \par   TAMMY CAN                        3\par \par \par \par Hematopathology Report\par \par \par \par \par Final Diagnosis\par 1, 2. Bone marrow biopsy, bone marrow clot, and bone marrow\par aspirate\par - Mild anemia with ring sideroblasts, dysmyelopoiesis,\par myeloid left shift, and mild monocytosis (minimal evaluable bone\par marrow)\par - Correlation with myeloid genomic panel pending\par \par See note and description.\par \par Diagnostic note: The bone marrow biopsy consists of cortical bone\par and the bone marrow aspirate is hemodilute.  Scant bone marrow\par elements fragments in clot are present.  In this limited\par evaluation features of atypical chronic myeloid leukemia, MDS\par with multilineage dysplasia and ring sideroblasts, and chronic\par myelomonocytic leukemia are present, including dysmyelopoiesis,\par anemia with increased ring sideroblasts (dyserythropoiesis),\par myeloid left shift (11%), and mild monocytosis (less than 2\par months duration). Classification is difficult with the presence\par of multilineage dysplasia , mild anemia (hemoglobin of 11.7g/dL,\par greater than the usual criteria of less than 10 g/dL), myeloid\par left shift, and short duration of monocytosis (11%, >= 1.0 K/uL).\par Followup and correlation with somatic myeloid mutation panel may\par be helpful.\par \par Comprehensive report with results of pending ancillary studies to\par follow.\par \par Dr. Flores was notified of the diagnosis on 12/19/18.\par \par Ancillary studies\par Bone marrow aspirate iron stain: Iron stores cannot be evaluated\par due to lack of spicules; numerous ring sideroblasts are present\par (greater than 50%).\par \par Flow cytometry:  The myeloid immunophenotypic findings show\par decreased myeloid granularity, no increase in myeloid immaturity,\par no monocytosis with partial loss of CD14, and normal myeloid\par antigen maturation pattern.\par \par Cytogenetics: 46,XY[20]\par \par Microscopic description:\par 1. Biopsy/clot:  Sections of bone marrow biopsy show cortical\par bone without marrow elements.  Scant sections of bone marrow\par fragments in clot are evaluated.  Cellularity cannot be\par accurately assessed.  There is trilineage\par \par \par \par \par \par \par TAMMY CAN                        3\par \par \par \par Hematopathology Report\par \par \par \par \par hematopoiesis with maturation, decreased M:E ratio (erythroid\par predominant), megakaryocytes are insufficient in number to\par characterize morphology, and iron stores increased.\par \par 2. Aspirate:  Cellular spicules are not present, however review\par of the smear is adequate for interpretation and differential (M:E\par ratio may not be valid due to some hemodilution).  Maturing and\par mature myeloid and erythroid elements are present with M:E ratio\par of 3.9:1, which may not be valid due to hemodilution. Myeloid\par elements show dysmyelopoieis with hypolobulation. Erythroid\par elements show mild dyserythropoiesis.  Megakaryocytes are not\par seen.\par \par Bone Marrow Aspirate Differential: (200 Cells).\par Type            %    Normal*\par Blast                0%   0-3\par Neutrophil and\par Precursors        78%  33-63\par Eosinophil           1%   1-5\par Basophil        0%   0-1\par Pronormoblast        1%   0-2\par Normoblast           18%  15-25\par Monocyte        0%   0-2\par Lymphocyte           2%   10-15\par Plasma cell          0%   0-1\par *Adult Range (may not be valid due to\par hemodilution)\par \par Comment\par Iron stain (examined to evaluate for iron stores; see microscopic\par description) and Giemsa stain (shows appropriate\par staining pattern) are performed and evaluated on block(s): 1A,\par 1B.\par \par Verified by: Rita Lubin\par (Electronic Signature)\par Reported on: 12/19/18 11:06 EST, [address] [place] [address]\par [id number]\par _________________________________________________________________\par \par Clinical History\par 78 years old male with macrocytic anemia, hyposegmented\par neutrophils, his flow cytometry showed atypical cells; r/o\par CML/myeloid disease\par \par Specimen(s) Submitted\par 1     Bone marrow biopsy\par 2     Bone marrow aspirate\par \par \par \par \par \par TAMMY CAN                        3\par \par \par \par Hematopathology Report\par \par \par \par \par \par Gross Description\par 1. The specimen is received in bouin's fixative and the specimen\par container is labeled: Bone marrow biopsy.  It consists of a 0.3 x\par 0.2 x 0.1 cm possible bone marrow core with a 2.0 x 1.3 x 0.6 cm\par aggregate of blood clot.  Entirely submitted.  Two cassettes: A =\par possible bone marrow core; B = blood clot.\par \par 2. Two Hodgson-Giemsa and one iron stained bone marrow aspirate\par smears are submitted [[id number]; ].\par \par In addition to other data that may appear on the specimen\par container, the label has been inspected to confirm the presence\par of the patient's name and date of birth.\par Maci Misael/[birth year] 14:44\par \par  \par \par  Ordered by: CROW FLORES       Collected/Examined: [id number] 01:58PM       \par Verified by: CROW FLORES [id number] 01:20PM       \par  Result Communication: No patient communication needed at this time;\par Stage: Final       \par  Performed at: [facility]       Resulted: [id number] 11:06AM       Last Updated: [id number] 01:20PM       Accession: [id number]    \par \par  HLX CG (CONST) Final Report             Final\par \par No Documents Attached\par \par \par   Test   Result   Flag Reference Goal \par   Chromosome Analysis Final Report Chromosome Analysis (ONC) Report     New \par   _______________________________________________________________\par Accession Number: [id number]\par Indication: Monocytic anemia, atypical cell on Flow\par Date Collected: 12/13/2018 8:11\par Date Received: 12/13/2018 13:46\par Date Reported: 12/18/2018 13:11\par Specimen Type: Bone Marrow\par Test Requested: Chromosome Analysis\par ________________________________________________________________             Metaphases Counted:       20\par Culture Duration:         24 and 48 hours                                      Metaphases Analyzed:      20\par Number of Cultures:       2\par Metaphases Karyotyped:    4\par Banding Technique:        GTG\par Band Resolution:          300 - 400\par Preparation Quality:      Adequate\par ________________________________________________________________\par Result: Normal male karyotype\par \par Karyotype: 46,XY[20]\par ________________________________________________________________\par Findings and Interpretation:\par No consistent numerical or structural chromosome abnormalities were observed in the cells analyzed.\par \par Disclaimer:\par Routine chromosome analysis may not detect subtle structural rearrangements within the limits of cytogenetic technology utilized in this study.\par \par Preliminary Report:\par No\par \par Pathologist: Alejandro Cantu MD\par \par Verified By: Cytogeneticist : Cortney Dumas, PhD, [facility]\par (Electronic Signature)\par \par  \par \par  Ordered by: CROW FLORES       Collected/Examined: [id number] 01:46PM       \par Verified by: CROW FLORES [id number] 02:07PM       \par  Result Communication: No patient communication needed at this time;\par Stage: Final       \par  Performed at: [facility] (Med Director: Brian Watts M.D)       Resulted: [id number] 01:11PM       Last Updated: [id number] 02:07PM       Accession: [id number]       \par

## 2019-05-29 NOTE — HISTORY OF PRESENT ILLNESS
[Disease:__________________________] : Disease: [unfilled] [de-identified] : Nov 19, 2018\par "78 yo male w/ hx of diabetes (type2), htn, high cholesterol, CAD s/p ACBG in 1999, COPD on home oxygen referred for macrocytic anemia noted on labs. Pt had incidental finding of hyposegmented neutrophils which was seen on smear of peripheral blood. He was referred for hematology evaluation to r/o MDS. Pt reports no new fatigue or acute changes in baseline functional status. He denies any recent bleeding or easy bruising. No new medication changes. " [de-identified] : IPSS-1, low risk [de-identified] : Dec 21, 2018\franklyn "Pt is here today accompanied with spouse. He reports significant pain with BMbx procedure, voiced reluctance to repeat test in the future. He is here for follow-up of Bmbx results. "\par \par ---------------------------------------------------------------------------------------\par Feb 25, 2019\franklyn "Pt comes in today for scheduled appt, he is accompanied with his wife. He has many questions regarding his poor pulmonary status. He otherwise has no complaints. "\par ---------------------------------------------------------------------------------------\franklyn Pt is here for scheduled f/u, accompanied with his wife. He continues to have significant hypoxia on RA. He uses CPAP at home with improved sleep. He does not use chronic supplemental Oxygen - per pt and wife this does not seem to benefit him. He was recently seen at [facility] by Dr Mg and had a pulm heart cath done on L to further determine the etiology for hypoxia. He has pulm f/u scheduled in June. He is requesting a copy of his reports to be sent to his pulmonologists at [facility].

## 2019-05-29 NOTE — PHYSICAL EXAM
[Ambulatory and capable of all self care but unable to carry out any work activities] : Status 2- Ambulatory and capable of all self care but unable to carry out any work activities. Up and about more than 50% of waking hours [Obese] : obese [Normal] : affect appropriate [de-identified] : scattered rales  [de-identified] : chronic post surgical changes to chest wall - well healed, regular rate [de-identified] : soft obese abdomen, no palpable masses.  [de-identified] : bilateral ankle edema 2+ [de-identified] : chronic vision impairment

## 2019-05-29 NOTE — ASSESSMENT
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

## 2019-09-24 ENCOUNTER — OUTPATIENT (OUTPATIENT)
Dept: OUTPATIENT SERVICES | Facility: HOSPITAL | Age: 79
LOS: 1 days | Discharge: HOME | End: 2019-09-24

## 2019-09-24 DIAGNOSIS — D64.9 ANEMIA, UNSPECIFIED: ICD-10-CM

## 2019-09-24 DIAGNOSIS — D47.1 CHRONIC MYELOPROLIFERATIVE DISEASE: ICD-10-CM

## 2019-09-26 ENCOUNTER — RESULT REVIEW (OUTPATIENT)
Age: 79
End: 2019-09-26

## 2019-09-26 ENCOUNTER — APPOINTMENT (OUTPATIENT)
Dept: HEMATOLOGY ONCOLOGY | Facility: CLINIC | Age: 79
End: 2019-09-26
Payer: MEDICARE

## 2019-09-26 VITALS
SYSTOLIC BLOOD PRESSURE: 130 MMHG | BODY MASS INDEX: 33.36 KG/M2 | TEMPERATURE: 98 F | DIASTOLIC BLOOD PRESSURE: 80 MMHG | OXYGEN SATURATION: 92 % | RESPIRATION RATE: 20 BRPM | HEART RATE: 68 BPM | WEIGHT: 216.05 LBS

## 2019-09-26 DIAGNOSIS — D53.9 NUTRITIONAL ANEMIA, UNSPECIFIED: ICD-10-CM

## 2019-09-26 DIAGNOSIS — D47.1 CHRONIC MYELOPROLIFERATIVE DISEASE: ICD-10-CM

## 2019-09-26 DIAGNOSIS — D46.20 REFRACTORY ANEMIA WITH EXCESS OF BLASTS, UNSPECIFIED: ICD-10-CM

## 2019-09-26 LAB
BASOPHILS # BLD AUTO: 0 K/UL — SIGNIFICANT CHANGE UP (ref 0–0.2)
BASOPHILS NFR BLD AUTO: 0.6 % — SIGNIFICANT CHANGE UP (ref 0–2)
EOSINOPHIL # BLD AUTO: 0 K/UL — SIGNIFICANT CHANGE UP (ref 0–0.5)
EOSINOPHIL NFR BLD AUTO: 0.6 % — SIGNIFICANT CHANGE UP (ref 0–6)
HCT VFR BLD CALC: 32.1 % — LOW (ref 39–50)
HGB BLD-MCNC: 9.7 G/DL — LOW (ref 13–17)
LYMPHOCYTES # BLD AUTO: 1.5 K/UL — SIGNIFICANT CHANGE UP (ref 1–3.3)
LYMPHOCYTES # BLD AUTO: 21.4 % — SIGNIFICANT CHANGE UP (ref 13–44)
MCHC RBC-ENTMCNC: 30.1 G/DL — LOW (ref 32–36)
MCHC RBC-ENTMCNC: 30.9 PG — SIGNIFICANT CHANGE UP (ref 27–34)
MCV RBC AUTO: 103 FL — HIGH (ref 80–100)
MONOCYTES # BLD AUTO: 0.9 K/UL — SIGNIFICANT CHANGE UP (ref 0–0.9)
MONOCYTES NFR BLD AUTO: 12.2 % — SIGNIFICANT CHANGE UP (ref 2–14)
NEUTROPHILS # BLD AUTO: 4.6 K/UL — SIGNIFICANT CHANGE UP (ref 1.8–7.4)
NEUTROPHILS NFR BLD AUTO: 65.2 % — SIGNIFICANT CHANGE UP (ref 43–77)
PLATELET # BLD AUTO: 271 K/UL — SIGNIFICANT CHANGE UP (ref 150–400)
RBC # BLD: 3.13 M/UL — LOW (ref 4.2–5.8)
RBC # FLD: 23.2 % — HIGH (ref 10.3–14.5)
WBC # BLD: 7.1 K/UL — SIGNIFICANT CHANGE UP (ref 3.8–10.5)
WBC # FLD AUTO: 7.1 K/UL — SIGNIFICANT CHANGE UP (ref 3.8–10.5)

## 2019-09-26 PROCEDURE — 99215 OFFICE O/P EST HI 40 MIN: CPT

## 2019-09-26 NOTE — RESULTS/DATA
[FreeTextEntry1] : \par  Pathology             Final\par \par No Documents Attached\par \par \par \par \par   TAMMY CAN                        3\par \par \par \par Hematopathology Report\par \par \par \par \par Final Diagnosis\par 1, 2. Bone marrow biopsy, bone marrow clot, and bone marrow\par aspirate\par - Mild anemia with ring sideroblasts, dysmyelopoiesis,\par myeloid left shift, and mild monocytosis (minimal evaluable bone\par marrow)\par - Correlation with myeloid genomic panel pending\par \par See note and description.\par \par Diagnostic note: The bone marrow biopsy consists of cortical bone\par and the bone marrow aspirate is hemodilute.  Scant bone marrow\par elements fragments in clot are present.  In this limited\par evaluation features of atypical chronic myeloid leukemia, MDS\par with multilineage dysplasia and ring sideroblasts, and chronic\par myelomonocytic leukemia are present, including dysmyelopoiesis,\par anemia with increased ring sideroblasts (dyserythropoiesis),\par myeloid left shift (11%), and mild monocytosis (less than 2\par months duration). Classification is difficult with the presence\par of multilineage dysplasia , mild anemia (hemoglobin of 11.7g/dL,\par greater than the usual criteria of less than 10 g/dL), myeloid\par left shift, and short duration of monocytosis (11%, >= 1.0 K/uL).\par Followup and correlation with somatic myeloid mutation panel may\par be helpful.\par \par Comprehensive report with results of pending ancillary studies to\par follow.\par \par Dr. Flores was notified of the diagnosis on 12/19/18.\par \par Ancillary studies\par Bone marrow aspirate iron stain: Iron stores cannot be evaluated\par due to lack of spicules; numerous ring sideroblasts are present\par (greater than 50%).\par \par Flow cytometry:  The myeloid immunophenotypic findings show\par decreased myeloid granularity, no increase in myeloid immaturity,\par no monocytosis with partial loss of CD14, and normal myeloid\par antigen maturation pattern.\par \par Cytogenetics: 46,XY[20]\par \par Microscopic description:\par 1. Biopsy/clot:  Sections of bone marrow biopsy show cortical\par bone without marrow elements.  Scant sections of bone marrow\par fragments in clot are evaluated.  Cellularity cannot be\par accurately assessed.  There is trilineage\par \par \par \par \par \par \par TAMMY CAN                        3\par \par \par \par Hematopathology Report\par \par \par \par \par hematopoiesis with maturation, decreased M:E ratio (erythroid\par predominant), megakaryocytes are insufficient in number to\par characterize morphology, and iron stores increased.\par \par 2. Aspirate:  Cellular spicules are not present, however review\par of the smear is adequate for interpretation and differential (M:E\par ratio may not be valid due to some hemodilution).  Maturing and\par mature myeloid and erythroid elements are present with M:E ratio\par of 3.9:1, which may not be valid due to hemodilution. Myeloid\par elements show dysmyelopoieis with hypolobulation. Erythroid\par elements show mild dyserythropoiesis.  Megakaryocytes are not\par seen.\par \par Bone Marrow Aspirate Differential: (200 Cells).\par Type            %    Normal*\par Blast                0%   0-3\par Neutrophil and\par Precursors        78%  33-63\par Eosinophil           1%   1-5\par Basophil        0%   0-1\par Pronormoblast        1%   0-2\par Normoblast           18%  15-25\par Monocyte        0%   0-2\par Lymphocyte           2%   10-15\par Plasma cell          0%   0-1\par *Adult Range (may not be valid due to\par hemodilution)\par \par Comment\par Iron stain (examined to evaluate for iron stores; see microscopic\par description) and Giemsa stain (shows appropriate\par staining pattern) are performed and evaluated on block(s): 1A,\par 1B.\par \par Verified by: Rita Lubin\par (Electronic Signature)\par Reported on: 12/19/18 11:06 EST, [address] [place] [address]\par [id number]\par _________________________________________________________________\par \par Clinical History\par 78 years old male with macrocytic anemia, hyposegmented\par neutrophils, his flow cytometry showed atypical cells; r/o\par CML/myeloid disease\par \par Specimen(s) Submitted\par 1     Bone marrow biopsy\par 2     Bone marrow aspirate\par \par \par \par \par \par TAMMY CAN                        3\par \par \par \par Hematopathology Report\par \par \par \par \par \par Gross Description\par 1. The specimen is received in bouin's fixative and the specimen\par container is labeled: Bone marrow biopsy.  It consists of a 0.3 x\par 0.2 x 0.1 cm possible bone marrow core with a 2.0 x 1.3 x 0.6 cm\par aggregate of blood clot.  Entirely submitted.  Two cassettes: A =\par possible bone marrow core; B = blood clot.\par \par 2. Two Hodgson-Giemsa and one iron stained bone marrow aspirate\par smears are submitted [[id number]; ].\par \par In addition to other data that may appear on the specimen\par container, the label has been inspected to confirm the presence\par of the patient's name and date of birth.\par Maci Misael/[birth year] 14:44\par \par  \par \par  Ordered by: CROW FLORES       Collected/Examined: [id number] 01:58PM       \par Verified by: CROW FLORES [id number] 01:20PM       \par  Result Communication: No patient communication needed at this time;\par Stage: Final       \par  Performed at: [facility]       Resulted: [id number] 11:06AM       Last Updated: [id number] 01:20PM       Accession: [id number]    \par \par  HLX CG (CONST) Final Report             Final\par \par No Documents Attached\par \par \par   Test   Result   Flag Reference Goal \par   Chromosome Analysis Final Report Chromosome Analysis (ONC) Report     New \par   _______________________________________________________________\par Accession Number: [id number]\par Indication: Monocytic anemia, atypical cell on Flow\par Date Collected: 12/13/2018 8:11\par Date Received: 12/13/2018 13:46\par Date Reported: 12/18/2018 13:11\par Specimen Type: Bone Marrow\par Test Requested: Chromosome Analysis\par ________________________________________________________________             Metaphases Counted:       20\par Culture Duration:         24 and 48 hours                                      Metaphases Analyzed:      20\par Number of Cultures:       2\par Metaphases Karyotyped:    4\par Banding Technique:        GTG\par Band Resolution:          300 - 400\par Preparation Quality:      Adequate\par ________________________________________________________________\par Result: Normal male karyotype\par \par Karyotype: 46,XY[20]\par ________________________________________________________________\par Findings and Interpretation:\par No consistent numerical or structural chromosome abnormalities were observed in the cells analyzed.\par \par Disclaimer:\par Routine chromosome analysis may not detect subtle structural rearrangements within the limits of cytogenetic technology utilized in this study.\par \par Preliminary Report:\par No\par \par Pathologist: Alejandro Cantu MD\par \par Verified By: Cytogeneticist : Cortney Dumas, PhD, [facility]\par (Electronic Signature)\par \par  \par \par  Ordered by: CROW FLORES       Collected/Examined: [id number] 01:46PM       \par Verified by: CROW FLORES [id number] 02:07PM       \par  Result Communication: No patient communication needed at this time;\par Stage: Final       \par  Performed at: [facility] (Med Director: Brian Watts M.D)       Resulted: [id number] 01:11PM       Last Updated: [id number] 02:07PM       Accession: [id number]       \par

## 2019-09-26 NOTE — HISTORY OF PRESENT ILLNESS
[Disease:__________________________] : Disease: [unfilled] [de-identified] : Nov 19, 2018\par "78 yo male w/ hx of diabetes (type2), htn, high cholesterol, CAD s/p ACBG in 1999, COPD on home oxygen referred for macrocytic anemia noted on labs. Pt had incidental finding of hyposegmented neutrophils which was seen on smear of peripheral blood. He was referred for hematology evaluation to r/o MDS. Pt reports no new fatigue or acute changes in baseline functional status. He denies any recent bleeding or easy bruising. No new medication changes. " [de-identified] : IPSS-1, low risk [de-identified] : Dec 21, 2018\franklyn "Pt is here today accompanied with spouse. He reports significant pain with BMbx procedure, voiced reluctance to repeat test in the future. He is here for follow-up of Bmbx results. "\par \par ---------------------------------------------------------------------------------------\par Feb 25, 2019adelia "Pt comes in today for scheduled appt, he is accompanied with his wife. He has many questions regarding his poor pulmonary status. He otherwise has no complaints. "\par ---------------------------------------------------------------------------------------\par May 29, 2019adelia "Pt is here for scheduled f/u, accompanied with his wife. He continues to have significant hypoxia on RA. He uses CPAP at home with improved sleep. He does not use chronic supplemental Oxygen - per pt and wife this does not seem to benefit him. He was recently seen at [facility] by Dr Mg and had a pulm heart cath done on L to further determine the etiology for hypoxia. He has pulm f/u scheduled in June. He is requesting a copy of his reports to be sent to his pulmonologists at [facility]. "\par --------------------------------------------------------------------------------------\franklyn Pt is here with his wife for f/u appt. He was recently seen at [facility] for possible DCCV, however test was cancelled due to anesthesia deeming pt high risk for respiratory distress with worsening hypoxia if given anesthesia for procedure. Pt was hospitalized for 3 dys, on discharge 9/19 his Hb was 8.4g. He was advised by local hematologist to consider procrit/aranesp for improvement in anemia. Pt today is tearful and voiced frustration with his chronic hypoxia which limits his mobility and every day life activities. He is interested in aranesp/procrit if it will improve his functional status. \par He denies any other acute complaints.

## 2019-09-26 NOTE — REASON FOR VISIT
[Follow-Up Visit] : a follow-up visit for [Myelodysplastic syndrome] : myelodysplastic syndrome [Myeloproliferative Disorder] : myeloproliferative disorder [Spouse] : spouse

## 2019-09-26 NOTE — REVIEW OF SYSTEMS
[Fatigue] : fatigue [Lower Ext Edema] : lower extremity edema [Shortness Of Breath] : shortness of breath [SOB on Exertion] : shortness of breath during exertion [Negative] : Heme/Lymph

## 2019-09-26 NOTE — PHYSICAL EXAM
[Capable of only limited self care, confined to bed or chair more than 50% of waking hours] : Status 3- Capable of only limited self care, confined to bed or chair more than 50% of waking hours [Obese] : obese [Normal] : affect appropriate [de-identified] : soft obese abdomen, no palpable masses.  [de-identified] : chronic post surgical changes to chest wall - well healed, regular rate [de-identified] : chronic vision impairment [de-identified] : bilateral ankle edema 1+

## 2019-09-30 LAB
ALBUMIN SERPL ELPH-MCNC: 4.3 G/DL
ALP BLD-CCNC: 53 U/L
ALT SERPL-CCNC: 6 U/L
ANION GAP SERPL CALC-SCNC: 14 MMOL/L
AST SERPL-CCNC: 19 U/L
BILIRUB SERPL-MCNC: 0.4 MG/DL
BUN SERPL-MCNC: 29 MG/DL
CALCIUM SERPL-MCNC: 8.9 MG/DL
CHLORIDE SERPL-SCNC: 104 MMOL/L
CO2 SERPL-SCNC: 21 MMOL/L
CREAT SERPL-MCNC: 1.24 MG/DL
GLUCOSE SERPL-MCNC: 45 MG/DL
POTASSIUM SERPL-SCNC: 5 MMOL/L
PROT SERPL-MCNC: 7 G/DL
SODIUM SERPL-SCNC: 139 MMOL/L

## 2022-01-01 ENCOUNTER — INPATIENT (INPATIENT)
Facility: HOSPITAL | Age: 82
LOS: 2 days | Discharge: HOME HEALTH | End: 2022-10-31
Attending: INTERNAL MEDICINE | Admitting: INTERNAL MEDICINE

## 2022-01-01 ENCOUNTER — TRANSCRIPTION ENCOUNTER (OUTPATIENT)
Age: 82
End: 2022-01-01

## 2022-01-01 ENCOUNTER — OUTPATIENT (OUTPATIENT)
Dept: OUTPATIENT SERVICES | Facility: HOSPITAL | Age: 82
LOS: 1 days | Discharge: HOME | End: 2022-01-01

## 2022-01-01 VITALS
OXYGEN SATURATION: 95 % | SYSTOLIC BLOOD PRESSURE: 102 MMHG | RESPIRATION RATE: 18 BRPM | TEMPERATURE: 97 F | HEART RATE: 105 BPM | DIASTOLIC BLOOD PRESSURE: 61 MMHG

## 2022-01-01 VITALS
OXYGEN SATURATION: 97 % | HEART RATE: 95 BPM | SYSTOLIC BLOOD PRESSURE: 122 MMHG | HEIGHT: 70 IN | DIASTOLIC BLOOD PRESSURE: 77 MMHG | RESPIRATION RATE: 17 BRPM | WEIGHT: 87.96 LBS | TEMPERATURE: 98 F

## 2022-01-01 DIAGNOSIS — E11.8 TYPE 2 DIABETES MELLITUS WITH UNSPECIFIED COMPLICATIONS: ICD-10-CM

## 2022-01-01 DIAGNOSIS — Z99.81 DEPENDENCE ON SUPPLEMENTAL OXYGEN: ICD-10-CM

## 2022-01-01 DIAGNOSIS — N18.30 CHRONIC KIDNEY DISEASE, STAGE 3 UNSPECIFIED: ICD-10-CM

## 2022-01-01 DIAGNOSIS — I25.10 ATHEROSCLEROTIC HEART DISEASE OF NATIVE CORONARY ARTERY WITHOUT ANGINA PECTORIS: ICD-10-CM

## 2022-01-01 DIAGNOSIS — J44.9 CHRONIC OBSTRUCTIVE PULMONARY DISEASE, UNSPECIFIED: ICD-10-CM

## 2022-01-01 DIAGNOSIS — E11.65 TYPE 2 DIABETES MELLITUS WITH HYPERGLYCEMIA: ICD-10-CM

## 2022-01-01 DIAGNOSIS — D63.1 ANEMIA IN CHRONIC KIDNEY DISEASE: ICD-10-CM

## 2022-01-01 DIAGNOSIS — I13.0 HYPERTENSIVE HEART AND CHRONIC KIDNEY DISEASE WITH HEART FAILURE AND STAGE 1 THROUGH STAGE 4 CHRONIC KIDNEY DISEASE, OR UNSPECIFIED CHRONIC KIDNEY DISEASE: ICD-10-CM

## 2022-01-01 DIAGNOSIS — K62.5 HEMORRHAGE OF ANUS AND RECTUM: ICD-10-CM

## 2022-01-01 DIAGNOSIS — Z95.5 PRESENCE OF CORONARY ANGIOPLASTY IMPLANT AND GRAFT: ICD-10-CM

## 2022-01-01 DIAGNOSIS — I27.20 PULMONARY HYPERTENSION, UNSPECIFIED: ICD-10-CM

## 2022-01-01 DIAGNOSIS — Z79.84 LONG TERM (CURRENT) USE OF ORAL HYPOGLYCEMIC DRUGS: ICD-10-CM

## 2022-01-01 DIAGNOSIS — I50.42 CHRONIC COMBINED SYSTOLIC (CONGESTIVE) AND DIASTOLIC (CONGESTIVE) HEART FAILURE: ICD-10-CM

## 2022-01-01 DIAGNOSIS — E78.5 HYPERLIPIDEMIA, UNSPECIFIED: ICD-10-CM

## 2022-01-01 DIAGNOSIS — D62 ACUTE POSTHEMORRHAGIC ANEMIA: ICD-10-CM

## 2022-01-01 DIAGNOSIS — I50.43 ACUTE ON CHRONIC COMBINED SYSTOLIC (CONGESTIVE) AND DIASTOLIC (CONGESTIVE) HEART FAILURE: ICD-10-CM

## 2022-01-01 DIAGNOSIS — E11.22 TYPE 2 DIABETES MELLITUS WITH DIABETIC CHRONIC KIDNEY DISEASE: ICD-10-CM

## 2022-01-01 DIAGNOSIS — I10 ESSENTIAL (PRIMARY) HYPERTENSION: ICD-10-CM

## 2022-01-01 DIAGNOSIS — J96.11 CHRONIC RESPIRATORY FAILURE WITH HYPOXIA: ICD-10-CM

## 2022-01-01 DIAGNOSIS — G47.33 OBSTRUCTIVE SLEEP APNEA (ADULT) (PEDIATRIC): ICD-10-CM

## 2022-01-01 DIAGNOSIS — Z87.891 PERSONAL HISTORY OF NICOTINE DEPENDENCE: ICD-10-CM

## 2022-01-01 DIAGNOSIS — Z79.899 OTHER LONG TERM (CURRENT) DRUG THERAPY: ICD-10-CM

## 2022-01-01 DIAGNOSIS — Z79.82 LONG TERM (CURRENT) USE OF ASPIRIN: ICD-10-CM

## 2022-01-01 DIAGNOSIS — K92.1 MELENA: ICD-10-CM

## 2022-01-01 DIAGNOSIS — Z95.1 PRESENCE OF AORTOCORONARY BYPASS GRAFT: ICD-10-CM

## 2022-01-01 DIAGNOSIS — I48.20 CHRONIC ATRIAL FIBRILLATION, UNSPECIFIED: ICD-10-CM

## 2022-01-01 DIAGNOSIS — I82.401 ACUTE EMBOLISM AND THROMBOSIS OF UNSPECIFIED DEEP VEINS OF RIGHT LOWER EXTREMITY: ICD-10-CM

## 2022-01-01 LAB
A1C WITH ESTIMATED AVERAGE GLUCOSE RESULT: 5.8 % — HIGH (ref 4–5.6)
ACANTHOCYTES BLD QL SMEAR: SLIGHT — SIGNIFICANT CHANGE UP
ALBUMIN SERPL ELPH-MCNC: 2.8 G/DL — LOW (ref 3.3–5)
ALBUMIN SERPL ELPH-MCNC: 2.8 G/DL — LOW (ref 3.3–5)
ALP SERPL-CCNC: 212 U/L — HIGH (ref 40–120)
ALP SERPL-CCNC: 223 U/L — HIGH (ref 40–120)
ALT FLD-CCNC: 17 U/L — SIGNIFICANT CHANGE UP (ref 12–78)
ALT FLD-CCNC: 19 U/L — SIGNIFICANT CHANGE UP (ref 12–78)
ANION GAP SERPL CALC-SCNC: 11 MMOL/L — SIGNIFICANT CHANGE UP (ref 5–17)
ANION GAP SERPL CALC-SCNC: 8 MMOL/L — SIGNIFICANT CHANGE UP (ref 5–17)
ANION GAP SERPL CALC-SCNC: 9 MMOL/L — SIGNIFICANT CHANGE UP (ref 5–17)
ANION GAP SERPL CALC-SCNC: 9 MMOL/L — SIGNIFICANT CHANGE UP (ref 5–17)
APTT BLD: 30.6 SEC — SIGNIFICANT CHANGE UP (ref 27.5–35.5)
AST SERPL-CCNC: 30 U/L — SIGNIFICANT CHANGE UP (ref 15–37)
AST SERPL-CCNC: 31 U/L — SIGNIFICANT CHANGE UP (ref 15–37)
BASO STIPL BLD QL SMEAR: PRESENT — SIGNIFICANT CHANGE UP
BASOPHILS # BLD AUTO: 0.08 K/UL — SIGNIFICANT CHANGE UP (ref 0–0.2)
BASOPHILS NFR BLD AUTO: 1 % — SIGNIFICANT CHANGE UP (ref 0–2)
BILIRUB SERPL-MCNC: 1.6 MG/DL — HIGH (ref 0.2–1.2)
BILIRUB SERPL-MCNC: 1.7 MG/DL — HIGH (ref 0.2–1.2)
BLD GP AB SCN SERPL QL: SIGNIFICANT CHANGE UP
BUN SERPL-MCNC: 30 MG/DL — HIGH (ref 7–23)
BUN SERPL-MCNC: 33 MG/DL — HIGH (ref 7–23)
BUN SERPL-MCNC: 38 MG/DL — HIGH (ref 7–23)
BUN SERPL-MCNC: 41 MG/DL — HIGH (ref 7–23)
BURR CELLS BLD QL SMEAR: SLIGHT — SIGNIFICANT CHANGE UP
CALCIUM SERPL-MCNC: 8.3 MG/DL — LOW (ref 8.5–10.1)
CALCIUM SERPL-MCNC: 8.6 MG/DL — SIGNIFICANT CHANGE UP (ref 8.5–10.1)
CALCIUM SERPL-MCNC: 8.9 MG/DL — SIGNIFICANT CHANGE UP (ref 8.5–10.1)
CALCIUM SERPL-MCNC: 9.2 MG/DL — SIGNIFICANT CHANGE UP (ref 8.5–10.1)
CHLORIDE SERPL-SCNC: 92 MMOL/L — LOW (ref 96–108)
CHLORIDE SERPL-SCNC: 94 MMOL/L — LOW (ref 96–108)
CHLORIDE SERPL-SCNC: 95 MMOL/L — LOW (ref 96–108)
CHLORIDE SERPL-SCNC: 96 MMOL/L — SIGNIFICANT CHANGE UP (ref 96–108)
CO2 SERPL-SCNC: 29 MMOL/L — SIGNIFICANT CHANGE UP (ref 22–31)
CO2 SERPL-SCNC: 30 MMOL/L — SIGNIFICANT CHANGE UP (ref 22–31)
CO2 SERPL-SCNC: 30 MMOL/L — SIGNIFICANT CHANGE UP (ref 22–31)
CO2 SERPL-SCNC: 32 MMOL/L — HIGH (ref 22–31)
CREAT SERPL-MCNC: 1.3 MG/DL — SIGNIFICANT CHANGE UP (ref 0.5–1.3)
CREAT SERPL-MCNC: 1.51 MG/DL — HIGH (ref 0.5–1.3)
CREAT SERPL-MCNC: 1.54 MG/DL — HIGH (ref 0.5–1.3)
CREAT SERPL-MCNC: 1.55 MG/DL — HIGH (ref 0.5–1.3)
EGFR: 45 ML/MIN/1.73M2 — LOW
EGFR: 46 ML/MIN/1.73M2 — LOW
EGFR: 47 ML/MIN/1.73M2 — LOW
EGFR: 56 ML/MIN/1.73M2 — LOW
EOSINOPHIL # BLD AUTO: 0.17 K/UL — SIGNIFICANT CHANGE UP (ref 0–0.5)
EOSINOPHIL NFR BLD AUTO: 2 % — SIGNIFICANT CHANGE UP (ref 0–6)
ESTIMATED AVERAGE GLUCOSE: 120 MG/DL — HIGH (ref 68–114)
FLUAV AG NPH QL: SIGNIFICANT CHANGE UP
FLUBV AG NPH QL: SIGNIFICANT CHANGE UP
GIANT PLATELETS BLD QL SMEAR: PRESENT — SIGNIFICANT CHANGE UP
GLUCOSE BLDC GLUCOMTR-MCNC: 119 MG/DL — HIGH (ref 70–99)
GLUCOSE BLDC GLUCOMTR-MCNC: 126 MG/DL — HIGH (ref 70–99)
GLUCOSE BLDC GLUCOMTR-MCNC: 135 MG/DL — HIGH (ref 70–99)
GLUCOSE BLDC GLUCOMTR-MCNC: 136 MG/DL — HIGH (ref 70–99)
GLUCOSE BLDC GLUCOMTR-MCNC: 149 MG/DL — HIGH (ref 70–99)
GLUCOSE BLDC GLUCOMTR-MCNC: 172 MG/DL — HIGH (ref 70–99)
GLUCOSE BLDC GLUCOMTR-MCNC: 201 MG/DL — HIGH (ref 70–99)
GLUCOSE BLDC GLUCOMTR-MCNC: 214 MG/DL — HIGH (ref 70–99)
GLUCOSE BLDC GLUCOMTR-MCNC: 223 MG/DL — HIGH (ref 70–99)
GLUCOSE BLDC GLUCOMTR-MCNC: 262 MG/DL — HIGH (ref 70–99)
GLUCOSE BLDC GLUCOMTR-MCNC: 309 MG/DL — HIGH (ref 70–99)
GLUCOSE BLDC GLUCOMTR-MCNC: 335 MG/DL — HIGH (ref 70–99)
GLUCOSE SERPL-MCNC: 107 MG/DL — HIGH (ref 70–99)
GLUCOSE SERPL-MCNC: 119 MG/DL — HIGH (ref 70–99)
GLUCOSE SERPL-MCNC: 126 MG/DL — HIGH (ref 70–99)
GLUCOSE SERPL-MCNC: 175 MG/DL — HIGH (ref 70–99)
HCT VFR BLD CALC: 33.4 % — LOW (ref 39–50)
HCT VFR BLD CALC: 34.2 % — LOW (ref 39–50)
HCT VFR BLD CALC: 35.6 % — LOW (ref 39–50)
HCT VFR BLD CALC: 35.9 % — LOW (ref 39–50)
HGB BLD-MCNC: 10.1 G/DL — LOW (ref 13–17)
HGB BLD-MCNC: 10.4 G/DL — LOW (ref 13–17)
HGB BLD-MCNC: 10.6 G/DL — LOW (ref 13–17)
HGB BLD-MCNC: 9.9 G/DL — LOW (ref 13–17)
HYPOGRAN NEUTS BLD QL SMEAR: PRESENT — SIGNIFICANT CHANGE UP
INR BLD: 1.47 RATIO — HIGH (ref 0.88–1.16)
LYMPHOCYTES # BLD AUTO: 0.76 K/UL — LOW (ref 1–3.3)
LYMPHOCYTES # BLD AUTO: 9 % — LOW (ref 13–44)
MACROCYTES BLD QL: SIGNIFICANT CHANGE UP
MAGNESIUM SERPL-MCNC: 1.7 MG/DL — SIGNIFICANT CHANGE UP (ref 1.6–2.6)
MAGNESIUM SERPL-MCNC: 1.7 MG/DL — SIGNIFICANT CHANGE UP (ref 1.6–2.6)
MAGNESIUM SERPL-MCNC: 1.9 MG/DL — SIGNIFICANT CHANGE UP (ref 1.6–2.6)
MANUAL SMEAR VERIFICATION: YES — SIGNIFICANT CHANGE UP
MCHC RBC-ENTMCNC: 29 G/DL — LOW (ref 32–36)
MCHC RBC-ENTMCNC: 29.5 G/DL — LOW (ref 32–36)
MCHC RBC-ENTMCNC: 29.6 G/DL — LOW (ref 32–36)
MCHC RBC-ENTMCNC: 29.8 G/DL — LOW (ref 32–36)
MCHC RBC-ENTMCNC: 30.2 PG — SIGNIFICANT CHANGE UP (ref 27–34)
MCHC RBC-ENTMCNC: 30.5 PG — SIGNIFICANT CHANGE UP (ref 27–34)
MCHC RBC-ENTMCNC: 30.5 PG — SIGNIFICANT CHANGE UP (ref 27–34)
MCHC RBC-ENTMCNC: 30.6 PG — SIGNIFICANT CHANGE UP (ref 27–34)
MCV RBC AUTO: 102.6 FL — HIGH (ref 80–100)
MCV RBC AUTO: 103.1 FL — HIGH (ref 80–100)
MCV RBC AUTO: 103.3 FL — HIGH (ref 80–100)
MCV RBC AUTO: 104.4 FL — HIGH (ref 80–100)
MONOCYTES # BLD AUTO: 1.26 K/UL — HIGH (ref 0–0.9)
MONOCYTES NFR BLD AUTO: 15 % — HIGH (ref 2–14)
MYELOCYTES NFR BLD: 2 % — HIGH (ref 0–0)
NEUTROPHILS # BLD AUTO: 5.8 K/UL — SIGNIFICANT CHANGE UP (ref 1.8–7.4)
NEUTROPHILS NFR BLD AUTO: 61 % — SIGNIFICANT CHANGE UP (ref 43–77)
NEUTS BAND # BLD: 8 % — SIGNIFICANT CHANGE UP (ref 0–8)
NRBC # BLD: 0 /100 WBCS — SIGNIFICANT CHANGE UP (ref 0–0)
NRBC # BLD: 1 /100 — HIGH (ref 0–0)
NRBC # BLD: SIGNIFICANT CHANGE UP /100 WBCS (ref 0–0)
OB PNL STL: POSITIVE
OVALOCYTES BLD QL SMEAR: SIGNIFICANT CHANGE UP
PHOSPHATE SERPL-MCNC: 3.3 MG/DL — SIGNIFICANT CHANGE UP (ref 2.5–4.5)
PHOSPHATE SERPL-MCNC: 4.1 MG/DL — SIGNIFICANT CHANGE UP (ref 2.5–4.5)
PLAT MORPH BLD: NORMAL — SIGNIFICANT CHANGE UP
PLATELET # BLD AUTO: 147 K/UL — LOW (ref 150–400)
PLATELET # BLD AUTO: 151 K/UL — SIGNIFICANT CHANGE UP (ref 150–400)
PLATELET # BLD AUTO: 194 K/UL — SIGNIFICANT CHANGE UP (ref 150–400)
PLATELET # BLD AUTO: 214 K/UL — SIGNIFICANT CHANGE UP (ref 150–400)
POLYCHROMASIA BLD QL SMEAR: SIGNIFICANT CHANGE UP
POTASSIUM SERPL-MCNC: 3.2 MMOL/L — LOW (ref 3.5–5.3)
POTASSIUM SERPL-MCNC: 3.4 MMOL/L — LOW (ref 3.5–5.3)
POTASSIUM SERPL-MCNC: 3.6 MMOL/L — SIGNIFICANT CHANGE UP (ref 3.5–5.3)
POTASSIUM SERPL-MCNC: 4.1 MMOL/L — SIGNIFICANT CHANGE UP (ref 3.5–5.3)
POTASSIUM SERPL-SCNC: 3.2 MMOL/L — LOW (ref 3.5–5.3)
POTASSIUM SERPL-SCNC: 3.4 MMOL/L — LOW (ref 3.5–5.3)
POTASSIUM SERPL-SCNC: 3.6 MMOL/L — SIGNIFICANT CHANGE UP (ref 3.5–5.3)
POTASSIUM SERPL-SCNC: 4.1 MMOL/L — SIGNIFICANT CHANGE UP (ref 3.5–5.3)
PROT SERPL-MCNC: 6.8 GM/DL — SIGNIFICANT CHANGE UP (ref 6–8.3)
PROT SERPL-MCNC: 6.8 GM/DL — SIGNIFICANT CHANGE UP (ref 6–8.3)
PROTHROM AB SERPL-ACNC: 17.6 SEC — HIGH (ref 10.5–13.4)
RBC # BLD: 3.24 M/UL — LOW (ref 4.2–5.8)
RBC # BLD: 3.31 M/UL — LOW (ref 4.2–5.8)
RBC # BLD: 3.44 M/UL — LOW (ref 4.2–5.8)
RBC # BLD: 3.47 M/UL — LOW (ref 4.2–5.8)
RBC # FLD: 26.8 % — HIGH (ref 10.3–14.5)
RBC # FLD: 26.9 % — HIGH (ref 10.3–14.5)
RBC # FLD: 27.3 % — HIGH (ref 10.3–14.5)
RBC # FLD: 27.6 % — HIGH (ref 10.3–14.5)
RBC BLD AUTO: ABNORMAL
SARS-COV-2 RNA SPEC QL NAA+PROBE: SIGNIFICANT CHANGE UP
SODIUM SERPL-SCNC: 130 MMOL/L — LOW (ref 135–145)
SODIUM SERPL-SCNC: 134 MMOL/L — LOW (ref 135–145)
SODIUM SERPL-SCNC: 135 MMOL/L — SIGNIFICANT CHANGE UP (ref 135–145)
SODIUM SERPL-SCNC: 136 MMOL/L — SIGNIFICANT CHANGE UP (ref 135–145)
VARIANT LYMPHS # BLD: 2 % — SIGNIFICANT CHANGE UP (ref 0–6)
WBC # BLD: 6.51 K/UL — SIGNIFICANT CHANGE UP (ref 3.8–10.5)
WBC # BLD: 6.56 K/UL — SIGNIFICANT CHANGE UP (ref 3.8–10.5)
WBC # BLD: 8.36 K/UL — SIGNIFICANT CHANGE UP (ref 3.8–10.5)
WBC # BLD: 8.41 K/UL — SIGNIFICANT CHANGE UP (ref 3.8–10.5)
WBC # FLD AUTO: 6.51 K/UL — SIGNIFICANT CHANGE UP (ref 3.8–10.5)
WBC # FLD AUTO: 6.56 K/UL — SIGNIFICANT CHANGE UP (ref 3.8–10.5)
WBC # FLD AUTO: 8.36 K/UL — SIGNIFICANT CHANGE UP (ref 3.8–10.5)
WBC # FLD AUTO: 8.41 K/UL — SIGNIFICANT CHANGE UP (ref 3.8–10.5)

## 2022-01-01 PROCEDURE — 99231 SBSQ HOSP IP/OBS SF/LOW 25: CPT

## 2022-01-01 PROCEDURE — 99239 HOSP IP/OBS DSCHRG MGMT >30: CPT

## 2022-01-01 PROCEDURE — 99233 SBSQ HOSP IP/OBS HIGH 50: CPT

## 2022-01-01 PROCEDURE — 99232 SBSQ HOSP IP/OBS MODERATE 35: CPT

## 2022-01-01 PROCEDURE — 93010 ELECTROCARDIOGRAM REPORT: CPT

## 2022-01-01 PROCEDURE — 99223 1ST HOSP IP/OBS HIGH 75: CPT

## 2022-01-01 PROCEDURE — 71045 X-RAY EXAM CHEST 1 VIEW: CPT | Mod: 26

## 2022-01-01 PROCEDURE — 99282 EMERGENCY DEPT VISIT SF MDM: CPT | Mod: FS

## 2022-01-01 PROCEDURE — 93970 EXTREMITY STUDY: CPT | Mod: 26

## 2022-01-01 PROCEDURE — 99285 EMERGENCY DEPT VISIT HI MDM: CPT

## 2022-01-01 PROCEDURE — 99222 1ST HOSP IP/OBS MODERATE 55: CPT

## 2022-01-01 RX ORDER — POTASSIUM CHLORIDE 20 MEQ
1 PACKET (EA) ORAL
Qty: 30 | Refills: 0
Start: 2022-01-01

## 2022-01-01 RX ORDER — FERROUS SULFATE 325(65) MG
325 TABLET ORAL DAILY
Refills: 0 | Status: DISCONTINUED | OUTPATIENT
Start: 2022-01-01 | End: 2022-01-01

## 2022-01-01 RX ORDER — POTASSIUM CHLORIDE 20 MEQ
40 PACKET (EA) ORAL ONCE
Refills: 0 | Status: COMPLETED | OUTPATIENT
Start: 2022-01-01 | End: 2022-01-01

## 2022-01-01 RX ORDER — ROSUVASTATIN CALCIUM 5 MG/1
1 TABLET ORAL
Qty: 0 | Refills: 0 | DISCHARGE

## 2022-01-01 RX ORDER — BUDESONIDE AND FORMOTEROL FUMARATE DIHYDRATE 160; 4.5 UG/1; UG/1
2 AEROSOL RESPIRATORY (INHALATION)
Qty: 0 | Refills: 0 | DISCHARGE

## 2022-01-01 RX ORDER — METOPROLOL TARTRATE 50 MG
50 TABLET ORAL DAILY
Refills: 0 | Status: DISCONTINUED | OUTPATIENT
Start: 2022-01-01 | End: 2022-01-01

## 2022-01-01 RX ORDER — BUDESONIDE AND FORMOTEROL FUMARATE DIHYDRATE 160; 4.5 UG/1; UG/1
2 AEROSOL RESPIRATORY (INHALATION)
Qty: 1 | Refills: 0
Start: 2022-01-01

## 2022-01-01 RX ORDER — POTASSIUM CHLORIDE 20 MEQ
1 PACKET (EA) ORAL
Qty: 0 | Refills: 0 | DISCHARGE

## 2022-01-01 RX ORDER — ASPIRIN/CALCIUM CARB/MAGNESIUM 324 MG
81 TABLET ORAL DAILY
Refills: 0 | Status: DISCONTINUED | OUTPATIENT
Start: 2022-01-01 | End: 2022-01-01

## 2022-01-01 RX ORDER — GLUCAGON INJECTION, SOLUTION 0.5 MG/.1ML
1 INJECTION, SOLUTION SUBCUTANEOUS ONCE
Refills: 0 | Status: DISCONTINUED | OUTPATIENT
Start: 2022-01-01 | End: 2022-01-01

## 2022-01-01 RX ORDER — ASPIRIN/CALCIUM CARB/MAGNESIUM 324 MG
1 TABLET ORAL
Qty: 0 | Refills: 0 | DISCHARGE

## 2022-01-01 RX ORDER — INSULIN LISPRO 100/ML
VIAL (ML) SUBCUTANEOUS EVERY 6 HOURS
Refills: 0 | Status: DISCONTINUED | OUTPATIENT
Start: 2022-01-01 | End: 2022-01-01

## 2022-01-01 RX ORDER — ATORVASTATIN CALCIUM 80 MG/1
80 TABLET, FILM COATED ORAL AT BEDTIME
Refills: 0 | Status: DISCONTINUED | OUTPATIENT
Start: 2022-01-01 | End: 2022-01-01

## 2022-01-01 RX ORDER — PANTOPRAZOLE SODIUM 20 MG/1
1 TABLET, DELAYED RELEASE ORAL
Qty: 0 | Refills: 0 | DISCHARGE

## 2022-01-01 RX ORDER — BUDESONIDE AND FORMOTEROL FUMARATE DIHYDRATE 160; 4.5 UG/1; UG/1
2 AEROSOL RESPIRATORY (INHALATION)
Refills: 0 | Status: DISCONTINUED | OUTPATIENT
Start: 2022-01-01 | End: 2022-01-01

## 2022-01-01 RX ORDER — METFORMIN HYDROCHLORIDE 850 MG/1
1 TABLET ORAL
Qty: 0 | Refills: 0 | DISCHARGE

## 2022-01-01 RX ORDER — SODIUM CHLORIDE 9 MG/ML
1000 INJECTION, SOLUTION INTRAVENOUS
Refills: 0 | Status: DISCONTINUED | OUTPATIENT
Start: 2022-01-01 | End: 2022-01-01

## 2022-01-01 RX ORDER — SENNA PLUS 8.6 MG/1
2 TABLET ORAL AT BEDTIME
Refills: 0 | Status: DISCONTINUED | OUTPATIENT
Start: 2022-01-01 | End: 2022-01-01

## 2022-01-01 RX ORDER — FOLIC ACID 0.8 MG
1 TABLET ORAL DAILY
Refills: 0 | Status: DISCONTINUED | OUTPATIENT
Start: 2022-01-01 | End: 2022-01-01

## 2022-01-01 RX ORDER — PANTOPRAZOLE SODIUM 20 MG/1
40 TABLET, DELAYED RELEASE ORAL
Refills: 0 | Status: DISCONTINUED | OUTPATIENT
Start: 2022-01-01 | End: 2022-01-01

## 2022-01-01 RX ORDER — DEXTROSE 50 % IN WATER 50 %
25 SYRINGE (ML) INTRAVENOUS ONCE
Refills: 0 | Status: DISCONTINUED | OUTPATIENT
Start: 2022-01-01 | End: 2022-01-01

## 2022-01-01 RX ORDER — METOPROLOL TARTRATE 50 MG
1 TABLET ORAL
Qty: 0 | Refills: 0 | DISCHARGE

## 2022-01-01 RX ORDER — PANTOPRAZOLE SODIUM 20 MG/1
40 TABLET, DELAYED RELEASE ORAL ONCE
Refills: 0 | Status: COMPLETED | OUTPATIENT
Start: 2022-01-01 | End: 2022-01-01

## 2022-01-01 RX ORDER — FOLIC ACID 0.8 MG
1 TABLET ORAL
Qty: 30 | Refills: 0
Start: 2022-01-01

## 2022-01-01 RX ORDER — DEXTROSE 50 % IN WATER 50 %
12.5 SYRINGE (ML) INTRAVENOUS ONCE
Refills: 0 | Status: DISCONTINUED | OUTPATIENT
Start: 2022-01-01 | End: 2022-01-01

## 2022-01-01 RX ORDER — FERROUS SULFATE 325(65) MG
1 TABLET ORAL
Qty: 30 | Refills: 0
Start: 2022-01-01

## 2022-01-01 RX ORDER — ALBUTEROL 90 UG/1
2 AEROSOL, METERED ORAL EVERY 6 HOURS
Refills: 0 | Status: DISCONTINUED | OUTPATIENT
Start: 2022-01-01 | End: 2022-01-01

## 2022-01-01 RX ORDER — ALBUTEROL 90 UG/1
2 AEROSOL, METERED ORAL
Qty: 0 | Refills: 0 | DISCHARGE

## 2022-01-01 RX ORDER — METOPROLOL TARTRATE 50 MG
1 TABLET ORAL
Qty: 30 | Refills: 0
Start: 2022-01-01

## 2022-01-01 RX ORDER — PANTOPRAZOLE SODIUM 20 MG/1
1 TABLET, DELAYED RELEASE ORAL
Qty: 60 | Refills: 0
Start: 2022-01-01

## 2022-01-01 RX ORDER — SENNA PLUS 8.6 MG/1
1 TABLET ORAL
Qty: 0 | Refills: 0 | DISCHARGE

## 2022-01-01 RX ORDER — PANTOPRAZOLE SODIUM 20 MG/1
40 TABLET, DELAYED RELEASE ORAL EVERY 12 HOURS
Refills: 0 | Status: DISCONTINUED | OUTPATIENT
Start: 2022-01-01 | End: 2022-01-01

## 2022-01-01 RX ORDER — FERROUS SULFATE 325(65) MG
1 TABLET ORAL
Qty: 0 | Refills: 0 | DISCHARGE

## 2022-01-01 RX ORDER — ATORVASTATIN CALCIUM 80 MG/1
1 TABLET, FILM COATED ORAL
Qty: 30 | Refills: 0
Start: 2022-01-01

## 2022-01-01 RX ORDER — LANOLIN ALCOHOL/MO/W.PET/CERES
3 CREAM (GRAM) TOPICAL AT BEDTIME
Refills: 0 | Status: DISCONTINUED | OUTPATIENT
Start: 2022-01-01 | End: 2022-01-01

## 2022-01-01 RX ORDER — INSULIN LISPRO 100/ML
VIAL (ML) SUBCUTANEOUS
Refills: 0 | Status: DISCONTINUED | OUTPATIENT
Start: 2022-01-01 | End: 2022-01-01

## 2022-01-01 RX ORDER — FOLIC ACID 0.8 MG
1 TABLET ORAL
Qty: 0 | Refills: 0 | DISCHARGE

## 2022-01-01 RX ORDER — ACETAMINOPHEN 500 MG
650 TABLET ORAL EVERY 6 HOURS
Refills: 0 | Status: DISCONTINUED | OUTPATIENT
Start: 2022-01-01 | End: 2022-01-01

## 2022-01-01 RX ORDER — DEXTROSE 50 % IN WATER 50 %
15 SYRINGE (ML) INTRAVENOUS ONCE
Refills: 0 | Status: DISCONTINUED | OUTPATIENT
Start: 2022-01-01 | End: 2022-01-01

## 2022-01-01 RX ADMIN — BUDESONIDE AND FORMOTEROL FUMARATE DIHYDRATE 2 PUFF(S): 160; 4.5 AEROSOL RESPIRATORY (INHALATION) at 06:54

## 2022-01-01 RX ADMIN — Medication 1: at 17:18

## 2022-01-01 RX ADMIN — Medication 100 MILLIGRAM(S): at 05:49

## 2022-01-01 RX ADMIN — Medication 50 MILLIGRAM(S): at 11:30

## 2022-01-01 RX ADMIN — ATORVASTATIN CALCIUM 80 MILLIGRAM(S): 80 TABLET, FILM COATED ORAL at 21:32

## 2022-01-01 RX ADMIN — SENNA PLUS 2 TABLET(S): 8.6 TABLET ORAL at 21:32

## 2022-01-01 RX ADMIN — Medication 325 MILLIGRAM(S): at 11:29

## 2022-01-01 RX ADMIN — BUDESONIDE AND FORMOTEROL FUMARATE DIHYDRATE 2 PUFF(S): 160; 4.5 AEROSOL RESPIRATORY (INHALATION) at 17:01

## 2022-01-01 RX ADMIN — PANTOPRAZOLE SODIUM 40 MILLIGRAM(S): 20 TABLET, DELAYED RELEASE ORAL at 17:57

## 2022-01-01 RX ADMIN — BUDESONIDE AND FORMOTEROL FUMARATE DIHYDRATE 2 PUFF(S): 160; 4.5 AEROSOL RESPIRATORY (INHALATION) at 21:32

## 2022-01-01 RX ADMIN — PANTOPRAZOLE SODIUM 40 MILLIGRAM(S): 20 TABLET, DELAYED RELEASE ORAL at 10:16

## 2022-01-01 RX ADMIN — Medication 325 MILLIGRAM(S): at 12:00

## 2022-01-01 RX ADMIN — Medication 1 MILLIGRAM(S): at 12:00

## 2022-01-01 RX ADMIN — Medication 325 MILLIGRAM(S): at 17:57

## 2022-01-01 RX ADMIN — Medication 1 MILLIGRAM(S): at 11:30

## 2022-01-01 RX ADMIN — Medication 40 MILLIEQUIVALENT(S): at 21:38

## 2022-01-01 RX ADMIN — BUDESONIDE AND FORMOTEROL FUMARATE DIHYDRATE 2 PUFF(S): 160; 4.5 AEROSOL RESPIRATORY (INHALATION) at 05:50

## 2022-01-01 RX ADMIN — Medication 81 MILLIGRAM(S): at 11:29

## 2022-01-01 RX ADMIN — PANTOPRAZOLE SODIUM 40 MILLIGRAM(S): 20 TABLET, DELAYED RELEASE ORAL at 06:17

## 2022-01-01 RX ADMIN — PANTOPRAZOLE SODIUM 40 MILLIGRAM(S): 20 TABLET, DELAYED RELEASE ORAL at 06:28

## 2022-01-01 RX ADMIN — BUDESONIDE AND FORMOTEROL FUMARATE DIHYDRATE 2 PUFF(S): 160; 4.5 AEROSOL RESPIRATORY (INHALATION) at 17:18

## 2022-01-01 RX ADMIN — Medication 100 MILLIGRAM(S): at 06:15

## 2022-01-01 RX ADMIN — Medication 2: at 23:38

## 2022-01-01 RX ADMIN — Medication 4: at 21:27

## 2022-01-01 RX ADMIN — SENNA PLUS 2 TABLET(S): 8.6 TABLET ORAL at 23:37

## 2022-01-01 RX ADMIN — Medication 81 MILLIGRAM(S): at 12:00

## 2022-01-01 RX ADMIN — Medication 100 MILLIGRAM(S): at 17:57

## 2022-01-01 RX ADMIN — Medication 4: at 12:00

## 2022-01-01 RX ADMIN — Medication 325 MILLIGRAM(S): at 11:30

## 2022-01-01 RX ADMIN — PANTOPRAZOLE SODIUM 40 MILLIGRAM(S): 20 TABLET, DELAYED RELEASE ORAL at 17:01

## 2022-01-01 RX ADMIN — ATORVASTATIN CALCIUM 80 MILLIGRAM(S): 80 TABLET, FILM COATED ORAL at 23:37

## 2022-01-01 RX ADMIN — PANTOPRAZOLE SODIUM 40 MILLIGRAM(S): 20 TABLET, DELAYED RELEASE ORAL at 17:17

## 2022-01-01 RX ADMIN — Medication 1 MILLIGRAM(S): at 17:57

## 2022-01-01 RX ADMIN — Medication 50 MILLIGRAM(S): at 17:57

## 2022-01-01 RX ADMIN — Medication 100 MILLIGRAM(S): at 06:28

## 2022-01-01 RX ADMIN — BUDESONIDE AND FORMOTEROL FUMARATE DIHYDRATE 2 PUFF(S): 160; 4.5 AEROSOL RESPIRATORY (INHALATION) at 06:17

## 2022-01-01 RX ADMIN — Medication 81 MILLIGRAM(S): at 11:30

## 2022-01-01 RX ADMIN — Medication 2: at 11:41

## 2022-01-01 RX ADMIN — Medication 3: at 11:30

## 2022-01-01 RX ADMIN — PANTOPRAZOLE SODIUM 40 MILLIGRAM(S): 20 TABLET, DELAYED RELEASE ORAL at 05:49

## 2022-01-01 RX ADMIN — Medication 2: at 21:31

## 2022-01-01 RX ADMIN — Medication 50 MILLIGRAM(S): at 06:28

## 2022-10-28 NOTE — H&P ADULT - VTE RISK ASSESSMENT
Start the antibiotic this morning.   Home until Monday evening.   New toothbrush after 3 days.   Return to care if any fevers (a temperature of 100.5 or above) develop after you've been on the antibiotic more than 48 hours.       VTE Assessment already completed for this visit

## 2022-10-28 NOTE — ED ADULT NURSE NOTE - CHIEF COMPLAINT QUOTE
Patient from [facility] for bright red blood noted from rectum this morning. Patient denies any pain at this time. Patient is on aspirin 81mg daily.  hx COPD, CHF, DM

## 2022-10-28 NOTE — H&P ADULT - HISTORY OF PRESENT ILLNESS
81 years old male with h/o HTN, HLD, DM, CKD, diastolic and systolic HF, COPD on 3-4 NC, CAD s/p CABG in 1999 and PCI with ANIRUDH to RCA on 3/22/22, pul HTN, CIARAN present to ED from [facility] with 1 episode of dark red blood in stool with clots. No abd pain, nausea or vomiting. No hematemesis.   Recent admission to [facility] in 09/2022 for CHF exacerbation. Hospital course was complicated by drop in Hb, received PRBC transfusion. Per patient, had EGD and colonoscopy. ? 3 clipps was placed with EGD.   Hemodynamically stable, afebrile, sat well with 3L NC. Hb 10.6 ( 10.1 10/15/2022 [facility] transfer paper), Plt 214, Na 130, Cr 1.51, glucose 175, Flu/COVID negative. CXR image reviewed, interstitial pattern noted.     SH: stopped smoking 22 years ago  FH: brother- prostate Ca 81 years old male with h/o HTN, HLD, DM, CKD, diastolic and systolic HF, COPD on 3-4 NC, CAD s/p CABG in 1999 and PCI with ANIRUDH to RCA on 3/22/22, pul HTN, CIARAN present to ED from [facility] with 1 episode of dark red blood in stool with clots. No abd pain, nausea or vomiting. No hematemesis.   Recent admission to [facility] in 09/2022 for CHF exacerbation. Hospital course was complicated by drop in Hb, received PRBC transfusion. Per patient, had EGD and colonoscopy. ? 3 clipps was placed  Hemodynamically stable, afebrile, sat well with 3L NC. Hb 10.6 ( 10.1 10/15/2022 [facility] transfer paper), Plt 214, Na 130, Cr 1.51, glucose 175, Flu/COVID negative. CXR image reviewed, interstitial pattern noted.     SH: stopped smoking 22 years ago  FH: brother- prostate Ca

## 2022-10-28 NOTE — H&P ADULT - PROBLEM SELECTOR PROBLEM 6
Please check with lab to see if they can add on the additional labs that were printed.   If not, then fax ordrers to [facility] for when she has her thyroid ultrasound done
CAD (coronary artery disease)

## 2022-10-28 NOTE — ED PROVIDER NOTE - OBJECTIVE STATEMENT
81 years old male from next door [facility] rehab c/o rectal bleed one episode this morning, Pt however denies taking blood thinning meds other than baby aspirin. Pt denies headache, dizziness, blurred visions, light sensitivities, focal/distal weakness or numbness, neck/back/hips/calfs pain,  nausea vomiting, fever, chills, abd pain, dysuria or irregular bowel movements., pt had Pfizer x 4.

## 2022-10-28 NOTE — CONSULT NOTE ADULT - SUBJECTIVE AND OBJECTIVE BOX
CHIEF COMPLAINT:  Patient is a 81y old  Male who presents with a chief complaint of rectal bleed (28 Oct 2022 13:05)      HPI:  81 years old male with h/o HTN, HLD, DM, CKD, diastolic and systolic HF, COPD on 3-4 NC, CAD s/p CABG in 1999 and PCI with ANIRUDH to RCA on 3/22/22, pul HTN, CIARAN present to ED from [facility] with 1 episode of dark red blood in stool with clots. No abd pain, nausea or vomiting. No hematemesis.   Recent admission to [facility] in 09/2022 for CHF exacerbation. Hospital course was complicated by drop in Hb, received PRBC transfusion. Per patient, had EGD and colonoscopy. ? 3 clipps was placed  Hemodynamically stable, afebrile, sat well with 3L NC. Hb 10.6 ( 10.1 10/15/2022 [facility] transfer paper), Plt 214, Na 130, Cr 1.51, glucose 175, Flu/COVID negative. CXR image reviewed, interstitial pattern noted.       ALLERGIES:  No Known Allergies    Home Medications:  *Patient is from [facility] Center: [phone] (28 Oct 2022 13:37)  Albuterol (Eqv-ProAir HFA) 90 mcg/inh inhalation aerosol: 2 puff(s) inhaled every 6 hours, As Needed (28 Oct 2022 13:37)  aspirin 81 mg oral tablet, chewable: 1 tab(s) orally once a day (28 Oct 2022 13:37)  Crestor 20 mg oral tablet: 1 tab(s) orally once a day (28 Oct 2022 13:37)  ferrous sulfate 325 mg (65 mg elemental iron) oral delayed release tablet: 1 tab(s) orally once a day (28 Oct 2022 13:37)  folic acid 1 mg oral tablet: 1 tab(s) orally once a day (28 Oct 2022 13:37)  metFORMIN 500 mg oral tablet: 1 tab(s) orally 2 times a day (28 Oct 2022 13:37)  metoprolol succinate 50 mg oral tablet, extended release: 1 tab(s) orally once a day (28 Oct 2022 13:37)  pantoprazole 40 mg oral delayed release tablet: 1 tab(s) orally once a day (28 Oct 2022 13:37)  Potassium Chloride (Eqv-K-Tab) 10 mEq oral tablet, extended release: 1 tab(s) orally once a day (28 Oct 2022 13:37)  Senna 8.6 mg oral tablet: 1 tab(s) orally once a day (at bedtime) (28 Oct 2022 13:37)  Symbicort 160 mcg-4.5 mcg/inh inhalation aerosol: 2 puff(s) inhaled 2 times a day (28 Oct 2022 13:37)  torsemide 100 mg oral tablet: 1 tab(s) orally once a day (28 Oct 2022 13:37)    PAST MEDICAL & SURGICAL HISTORY:  Chronic CHF      Chronic obstructive pulmonary disease with frequent exacerbations      Diabetes      Anemia      Hypertension      FAMILY HISTORY:      SOCIAL HISTORY:  SH: stopped smoking 22 years ago  FH: brother- prostate Ca (28 Oct 2022 13:05)    REVIEW OF SYSTEMS:  General:  No wt loss, fevers, chills, night sweats  Eyes:  Good vision, no reported pain  ENT:  No sore throat, pain, runny nose, dysphagia  CV:  No pain, palpitations, hypo/hypertension  Resp:  No dyspnea, cough, tachypnea, wheezing  GI:  No pain, nausea, vomiting, diarrhea, constipation  :  No pain, bleeding, incontinence, nocturia  Muscle:  No pain, weakness  Breast:  No pain, abscess, mass, discharge  Neuro:  No weakness, tingling, memory problems  Psych:  No fatigue, insomnia, mood problems, depression  Endocrine:  No polyuria, polydipsia, cold/heat intolerance  Heme:  No petechiae, ecchymosis, easy bruisability  Skin:  No rash, edema    PHYSICAL EXAM:  Vital Signs:  Vital Signs Last 24 Hrs  T(C): 36.3 (28 Oct 2022 11:45), Max: 36.4 (28 Oct 2022 09:22)  T(F): 97.3 (28 Oct 2022 11:45), Max: 97.6 (28 Oct 2022 09:22)  HR: 99 (28 Oct 2022 11:45) (95 - 99)  BP: 131/71 (28 Oct 2022 11:45) (122/77 - 131/71)  RR: 20 (28 Oct 2022 11:45) (17 - 20)  SpO2: 95% (28 Oct 2022 11:45) (95% - 97%)    Parameters below as of 28 Oct 2022 11:45  Patient On (Oxygen Delivery Method): nasal cannula      I&O's Summary    I&O's Detail      Tele:     Constitutional: well developed, normal appearance, well groomed, well nourished, no deformities and no acute distress.   Eyes: the conjunctiva exhibited no abnormalities and the eyelids demonstrated no xanthelasmas.   HEENT: normal oral mucosa, no oral pallor and no oral cyanosis.   Neck: normal jugular venous A waves present, normal jugular venous V waves present and no jugular venous huynh A waves.   Pulmonary: no respiratory distress, normal respiratory rhythm and effort, no accessory muscle use and lungs were clear to auscultation bilaterally.   Cardiovascular: heart rate and rhythm were normal, normal S1 and S2 and no murmur, gallop, rub, heave or thrill are present.   Abdomen: soft, non-tender.  Musculoskeletal: the gait could not be assessed.   Extremities: no clubbing of the fingernails, no localized cyanosis, no petechial hemorrhages and no ischemic changes.   Skin: normal skin color and pigmentation, no rash, no venous stasis, no skin lesions, no skin ulcer and no xanthoma was observed.   Psychiatric: oriented to person, place, and time, the affect was normal, the mood was normal and not feeling anxious.      LABORATORY:                          10.6   8.41  )-----------( 214      ( 28 Oct 2022 10:20 )             35.6     10-28    130<L>  |  92<L>  |  41<H>  ----------------------------<  175<H>  4.1   |  30  |  1.51<H>    Ca    9.2      28 Oct 2022 10:20  Mg     1.7     10-28    TPro  6.8  /  Alb  2.8<L>  /  TBili  1.6<H>  /  DBili  x   /  AST  31  /  ALT  19  /  AlkPhos  223<H>  10-28        LIVER FUNCTIONS - ( 28 Oct 2022 10:20 )  Alb: 2.8 g/dL / Pro: 6.8 gm/dL / ALK PHOS: 223 U/L / ALT: 19 U/L / AST: 31 U/L / GGT: x           PT/INR - ( 28 Oct 2022 10:20 )   PT: 17.6 sec;   INR: 1.47 ratio         PTT - ( 28 Oct 2022 10:20 )  PTT:30.6 sec      IMAGING:    ASSESSMENT:  81 years old male with h/o HTN, HLD, DM, CKD, diastolic and systolic HF, COPD on 3-4 NC, CAD s/p CABG in 1999 and PCI with ANIRUDH to RCA on 3/22/22, pul HTN, CIARAN present to ED from [facility] with 1 episode of dark red blood in stool with clots. No abd pain, nausea or vomiting. No hematemesis.   Recent admission to [facility] in 09/2022 for CHF exacerbation. Hospital course was complicated by drop in Hb, received PRBC transfusion. Per patient, had EGD and colonoscopy. ? 3 clipps was placed  Hemodynamically stable, afebrile, sat well with 3L NC. Hb 10.6 ( 10.1 10/15/2022 [facility] transfer paper), Plt 214, Na 130, Cr 1.51, glucose 175, Flu/COVID negative. CXR image reviewed, interstitial pattern noted.     PLAN:       atorvastatin 80 milliGRAM(s) Oral at bedtime  budesonide 160 MICROgram(s)/formoterol 4.5 MICROgram(s) Inhaler 2 Puff(s) Inhalation two times a day  ferrous    sulfate 325 milliGRAM(s) Oral daily  folic acid 1 milliGRAM(s) Oral daily  glucagon  Injectable 1 milliGRAM(s) IntraMuscular once  insulin lispro (ADMELOG) corrective regimen sliding scale   SubCutaneous every 6 hours  metoprolol succinate ER 50 milliGRAM(s) Oral daily  pantoprazole  Injectable 40 milliGRAM(s) IV Push every 12 hours  senna 2 Tablet(s) Oral at bedtime  torsemide 100 milliGRAM(s) Oral daily    prelim note    Dmitry Daigle MD, FACC, FASE, FASNC, FACP  Director, Heart Failure Services  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 Jarrell at [facility], history of obstructive sleep apnea and pulm hypertension recently admitted to [facility] for colonoscopy and describes clipping procedure.  He has been off aspirin for about 2 weeks now and remains off antiplatelets completely.  He has been recovering at [facility] but was sent over for the hospital evaluation for bright red blood per rectum.  He feels otherwise fine and denies current chest symptoms.  He seemed to be in asymptomatic A. fib on telemetry.  Spoke with patient's wife at bedside to obtain more information.      ALLERGIES:  No Known Allergies    Home Medications:  *Patient is from [facility] Center: [phone] (28 Oct 2022 13:37)  Albuterol (Eqv-ProAir HFA) 90 mcg/inh inhalation aerosol: 2 puff(s) inhaled every 6 hours, As Needed (28 Oct 2022 13:37)  aspirin 81 mg oral tablet, chewable: 1 tab(s) orally once a day (28 Oct 2022 13:37)  Crestor 20 mg oral tablet: 1 tab(s) orally once a day (28 Oct 2022 13:37)  ferrous sulfate 325 mg (65 mg elemental iron) oral delayed release tablet: 1 tab(s) orally once a day (28 Oct 2022 13:37)  folic acid 1 mg oral tablet: 1 tab(s) orally once a day (28 Oct 2022 13:37)  metFORMIN 500 mg oral tablet: 1 tab(s) orally 2 times a day (28 Oct 2022 13:37)  metoprolol succinate 50 mg oral tablet, extended release: 1 tab(s) orally once a day (28 Oct 2022 13:37)  pantoprazole 40 mg oral delayed release tablet: 1 tab(s) orally once a day (28 Oct 2022 13:37)  Potassium Chloride (Eqv-K-Tab) 10 mEq oral tablet, extended release: 1 tab(s) orally once a day (28 Oct 2022 13:37)  Senna 8.6 mg oral tablet: 1 tab(s) orally once a day (at bedtime) (28 Oct 2022 13:37)  Symbicort 160 mcg-4.5 mcg/inh inhalation aerosol: 2 puff(s) inhaled 2 times a day (28 Oct 2022 13:37)  torsemide 100 mg oral tablet: 1 tab(s) orally once a day (28 Oct 2022 13:37)    PAST MEDICAL & SURGICAL HISTORY:  Chronic CHF      Chronic obstructive pulmonary disease with frequent exacerbations      Diabetes      Anemia      Hypertension      FAMILY HISTORY:  n/a    SOCIAL HISTORY:  SH: stopped smoking 22 years ago  FH: brother- prostate Ca (28 Oct 2022 13:05)    REVIEW OF SYSTEMS:  General:  No wt loss, fevers, chills, night sweats  Eyes:  Good vision, no reported pain  ENT:  No sore throat, pain, runny nose, dysphagia  CV:  No pain, palpitations, hypo/hypertension  Resp:  No dyspnea, cough, tachypnea, wheezing  GI:  No pain, nausea, vomiting, diarrhea, constipation  :  No pain, bleeding, incontinence, nocturia  Muscle:  No pain, weakness  Breast:  No pain, abscess, mass, discharge  Neuro:  No weakness, tingling, memory problems  Psych:  No fatigue, insomnia, mood problems, depression  Endocrine:  No polyuria, polydipsia, cold/heat intolerance  Heme:  No petechiae, ecchymosis, easy bruisability  Skin:  No rash, edema    PHYSICAL EXAM:  Vital Signs:  Vital Signs Last 24 Hrs  T(C): 36.3 (28 Oct 2022 11:45), Max: 36.4 (28 Oct 2022 09:22)  T(F): 97.3 (28 Oct 2022 11:45), Max: 97.6 (28 Oct 2022 09:22)  HR: 99 (28 Oct 2022 11:45) (95 - 99)  BP: 131/71 (28 Oct 2022 11:45) (122/77 - 131/71)  RR: 20 (28 Oct 2022 11:45) (17 - 20)  SpO2: 95% (28 Oct 2022 11:45) (95% - 97%)    Parameters below as of 28 Oct 2022 11:45  Patient On (Oxygen Delivery Method): nasal cannula      Tele: AF    Constitutional: well developed, normal appearance, well groomed, well nourished, no deformities and no acute distress.   Eyes: the conjunctiva exhibited no abnormalities and the eyelids demonstrated no xanthelasmas.   HEENT: normal oral mucosa, no oral pallor and no oral cyanosis.   Neck: normal jugular venous A waves present, normal jugular venous V waves present and no jugular venous huynh A waves.   Pulmonary: no respiratory distress, normal respiratory rhythm and effort, no accessory muscle use and lungs were clear to auscultation bilaterally.   Cardiovascular: heart rate and rhythm were normal, normal S1 and S2 and no murmur, gallop, rub, heave or thrill are present.   Abdomen: soft, non-tender.  Musculoskeletal: the gait could not be assessed.   Extremities: no clubbing of the fingernails, no localized cyanosis, no petechial hemorrhages and no ischemic changes.   Skin: normal skin color and pigmentation, no rash, no venous stasis, no skin lesions, no skin ulcer and no xanthoma was observed.   Psychiatric: oriented to person, place, and time, the affect was normal, the mood was normal and not feeling anxious.      LABORATORY:                          10.6   8.41  )-----------( 214      ( 28 Oct 2022 10:20 )             35.6     10-28    130<L>  |  92<L>  |  41<H>  ----------------------------<  175<H>  4.1   |  30  |  1.51<H>    Ca    9.2      28 Oct 2022 10:20  Mg     1.7     10-28    TPro  6.8  /  Alb  2.8<L>  /  TBili  1.6<H>  /  DBili  x   /  AST  31  /  ALT  19  /  AlkPhos  223<H>  10-28        LIVER FUNCTIONS - ( 28 Oct 2022 10:20 )  Alb: 2.8 g/dL / Pro: 6.8 gm/dL / ALK PHOS: 223 U/L / ALT: 19 U/L / AST: 31 U/L / GGT: x           PT/INR - ( 28 Oct 2022 10:20 )   PT: 17.6 sec;   INR: 1.47 ratio         PTT - ( 28 Oct 2022 10:20 )  PTT:30.6 sec      IMAGING:  < from: Xray Chest 1 View-PORTABLE IMMEDIATE (Xray Chest 1 View-PORTABLE IMMEDIATE .) (10.28.22 @ 10:51) >  IMPRESSION: Increased interstitial pattern. Micra pacemaker and left loop   recorder noted.    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 Jarrell at [facility], history of obstructive sleep apnea and pulm hypertension recently admitted to [facility] for colonoscopy and describes clipping procedure.  He has been off aspirin for about 2 weeks now and remains off antiplatelets completely.  He has been recovering at [facility] but was sent over for the hospital evaluation for bright red blood per rectum.  He feels otherwise fine and denies current chest symptoms.  He seemed to be in asymptomatic A. fib on telemetry.  Spoke with patient's wife at bedside to obtain more information.    PLAN:     Continue high-intensity atorvastatin for CAD management.  Stay on metoprolol for heart rate and blood pressure management.  May need to raise the dose if necessary for further rate control.  Continue torsemide for heart failure management.  Continue PPI and GI following.  Follow labs.  Continue glycemic management of type 2 diabetes. Seems at high risk for any antiplatelets or anticoagulation now, therefore, hold off. We will follow with you.    Dmitry Daigle MD, FACC, MAGO SCHWARTZ, FACP  Director, Heart Failure Services  [facility]  , Department of Cardiology  [facility]

## 2022-10-28 NOTE — H&P ADULT - NSHPLABSRESULTS_GEN_ALL_CORE
[facility] ( [facility])  Cardiac catheterization 3/22/2022:  Severe multi-vessel coronary artery disease s/p coronary artery bypass grafting (patent LIMA to LAD, patent SVG to OM1)  Severe 1-vessel coronary artery disease with 70-80% proximal RCA stenosis. S/p successful PCI with ANIRUDH to Native RCA  Coronary artery disease with patent prior stent in mid RCA. Severe Pulmonary hypertension    Echocardiogram 9/21/2022:CONCLUSION:  To enhance imaging, Definity microbubble echo contrast was administered intravenously.  There is paradoxical septal motion due to right ventricular pressure overload. The basal and mid inferolateral wall and basal inferior wall are  hypokinetic. Contractility of all remaining LV segments is normal.  LV ejection fraction is mildly decreased (40 %).  Grade II left ventricular diastolic dysfunction with elevated left atrial pressure.  There is moderate right atrial dilatation. The right ventricle is moderately dilated.  There is mild aortic valve thickening. The aortic valve is mildly  calcified. There is no aortic stenosis. There is trace aortic regurgitation.  There is mitral annular calcification. There is mild mitral regurgitation.  There is moderate tricuspid regurgitation.  There is no pericardial effusion.  No IVC measurements, so RA and RV pressures could not be accurately estimated. Based on the TR velocity, moderate to severe pulmonary hypertension is present. No significant change from prior study

## 2022-10-28 NOTE — ED ADULT TRIAGE NOTE - CHIEF COMPLAINT QUOTE
Patient from [facility] for bright red blood noted from rectum this morning. Patient denies any pain at this time. Patient is on aspirin 81mg daily.  hx COPD, CHF, DM

## 2022-10-28 NOTE — H&P ADULT - PROBLEM SELECTOR PLAN 1
present with dark red blood with clots  Colonoscopy on 10/7/22 at [facility] with angioectasia s/p clipping ( record obtained from [facility] and placed in chart)  Hb stable  IV PPI  Monitor H/H  GI consulted  Cardiology consulted  NPO

## 2022-10-28 NOTE — ED PROVIDER NOTE - NSICDXPASTMEDICALHX_GEN_ALL_CORE_FT
PAST MEDICAL HISTORY:  Anemia     Chronic CHF     Chronic obstructive pulmonary disease with frequent exacerbations     Diabetes     Hypertension

## 2022-10-28 NOTE — ED PROVIDER NOTE - CONSTITUTIONAL, MLM
Well appearing, awake, alert, oriented to person, place, time/situation and in no apparent distress. Speaking in clear full sentences no nasal flaring no shoulders retractions no idaphoresis normal...

## 2022-10-28 NOTE — H&P ADULT - ASSESSMENT
81 years old male with h/o HTN, HLD, DM, CKD, diastolic and systolic HF, COPD on 3-4 NC, CAD s/p CABG in 1999 and PCI with ANIRUDH to RCA on 3/22/22, pul HTN, CIARAN present to ED from [facility] with 1 episode of dark red blood in stool with clots. No abd pain, nausea or vomiting. No hematemesis.   Recent admission to [facility] in 09/2022 for CHF exacerbation. Hospital course was complicated by drop in Hb, received PRBC transfusion. Per patient, had EGD and colonoscopy. ? 3 clipps was placed.  Hemodynamically stable, afebrile, sat well with 3L NC. Hb 10.6 ( 10.1 10/15/2022 [facility] transfer paper), Plt 214, Na 130, Cr 1.51, glucose 175, Flu/COVID negative. CXR image reviewed, interstitial pattern noted.       Admitted with rectal bleed

## 2022-10-28 NOTE — H&P ADULT - NSHPPHYSICALEXAM_GEN_ALL_CORE
CONSTITUTIONAL: Well developed, well nourished, alert and cooperative, mild respiratory distress  EYES: PERRL, , no scleral icterus  ENT: Mucosa moist, tongue normal. No  NECK: Neck supple, trachea midline, non-tender  CARDIAC: Normal S1 and S2. Regular rate and rhythms. No murmurs. Pedal edema++  LUNGS: Equal air entry both lungs. Rales +. Increase respiratory effort.   ABDOMEN: Soft, nondistended, nontender. No guarding or rebound tenderness. No hepatomegaly or splenomegaly. Bowel sound normal.   MUSCULOSKELETAL: Normocephalic, atraumatic. No significant deformity or joint abnormality  NEUROLOGICAL: No gross motor or sensory deficits  SKIN: no lesions or eruptions. Normal turgor  PSYCHIATRIC: A&O x 3, appropriate mood and affect

## 2022-10-28 NOTE — CONSULT NOTE ADULT - SUBJECTIVE AND OBJECTIVE BOX
81M hx HTN, DM, systolic and diastolic heart failure, COPD on home O2,CAD s/p PCI 3/2022, severe PAH on cath 3/2022, CIARAN presenting from [facility] for BRBPR x1 this morning. Sent to the ED for further evaluation.     Was hospitalized earlier this month at [place]. Had EGD and colonoscopy on 10/7/22 for RADHA. EGD with gastritis and duodenitis, pathology with iron pill gastritis. Colonoscopy with fair prep, with two sub-cm polyps in the descending colon, removed with hot snare and then each clipped (path TAs). Single large angioectasia in the ascending colon that was clipped, single medium sized angioectasia in the rectum that was clipped. Few diverticula in the sigmoid. Descending and transverse were moderately tortuous, rquiring patients position changed to supine to allow passage of the scope.    Patient denies abdominal pain. No N/V. Has never had any bleeding like this before. Bleeding was fresh red, once, this morning. hgb was 10.1 at [facility] on 10/15.    PMH/PSH--  Chronic systolic and diastolic CHF  Chronic obstructive pulmonary disease with frequent exacerbations, on home O2  Diabetes  Anemia  Hypertension  CAD s/p PCI 3/2022  CIARAN  Severe PAH  Hx CABG    Allergies--  No Known Allergies    Medications--  Other: acetaminophen     Tablet .. PRN  ALBUTerol    90 MICROgram(s) HFA Inhaler PRN  atorvastatin  budesonide 160 MICROgram(s)/formoterol 4.5 MICROgram(s) Inhaler  dextrose 5%.  dextrose 5%.  dextrose 50% Injectable  dextrose 50% Injectable  dextrose 50% Injectable  dextrose Oral Gel PRN  ferrous    sulfate  folic acid  glucagon  Injectable  insulin lispro (ADMELOG) corrective regimen sliding scale  melatonin PRN  metoprolol succinate ER  pantoprazole  Injectable  senna  torsemide    Social History--  EtOH: denies   Tobacco: former  Drug Use: denies     Family/Marital History--non-contributory    Review of Systems:  A >=10-point review of systems was obtained.     Pertinent positives and negatives--  Constitutional: No fevers. No Chills. No Rigors.   Eyes: No eye pain or vision changes.   ENMT: No throat pain. +hearing loss.   Cardiovascular: No chest pain. No palpitations.  Respiratory: No shortness of breath. No cough.  Gastrointestinal: No nausea or vomiting. No diarrhea or constipation. +hematochezia. No abdominal pain.   Musculoskeletal: No joint or muscle pain.  Skin: No rashes or lesions.  Neurologic: No weakness or disorientation  Psychiatric: Pleasant. Appropriate affect.  Endocrine: No polyuria or polydipsia  Heme/Lymphatic: +easy bruising; no immunologic issues    Review of systems otherwise negative except as previously noted.    Physical Exam--  Vital Signs: T(F): 97.3 (10-28-22 @ 11:45), Max: 97.6 (10-28-22 @ 09:22)  HR: 99 (10-28-22 @ 11:45)  BP: 131/71 (10-28-22 @ 11:45)  RR: 20 (10-28-22 @ 11:45)  SpO2: 95% (10-28-22 @ 11:45)    General: Nontoxic-appearing in no acute distress.  HEENT: AT/NC. PERRL. Anicteric. Conjunctiva pink and moist.   Neck: Not rigid. No sense of mass.  Nodes: None palpable.  Lungs: Clear bilaterally without rales, wheezing or rhonchi  Heart: Regular rate and rhythm. No Murmur. No rub. No gallop.   Abdomen: Bowel sounds present and normoactive. Soft. Nondistended. Nontender. Large ecchymosis over LLQ  Extremities: No cyanosis or clubbing. 2+ LE edema   Skin: Warm. Dry. Good turgor. No rash. .  Psychiatric: Appropriate affect and mood for situation.     Laboratory & Imaging Data--  CBC                        10.6   8.41  )-----------( 214      ( 28 Oct 2022 10:20 )             35.6       Chemistries  10-28    130<L>  |  92<L>  |  41<H>  ----------------------------<  175<H>  4.1   |  30  |  1.51<H>    Ca    9.2      28 Oct 2022 10:20  Mg     1.7     10-28    TPro  6.8  /  Alb  2.8<L>  /  TBili  1.6<H>  /  DBili  x   /  AST  31  /  ALT  19  /  AlkPhos  223<H>  10-28    Impression: 81M with multiple cardiopulmonary comorbidities presenting with hematochezia this morning. Likely lower GI bleeding. Hemodynamically stable and hgb stable compared to two weeks ago. Differential includes post polypectomy bleed given timing since procedures at [place], vs diverticular bleed, vs bleeding angioectasia. As bleeding has not recurred all day, suspect it will self resolve.     Recommend:  - regular diet  - trend CBC daily  - monitor bm for blood  - if patient becomes hemodynamically unstable with BRBPR, would obtain CTA    Discussed with pt and wife at bedside  Discussed with hospitalist via Teams

## 2022-10-28 NOTE — H&P ADULT - PROBLEM SELECTOR PLAN 6
CAD s/p CABG in 1999 and PCI with ANIRUDH to RCA on 3/22/22  Currently with GI bleed  Will continue aspirin for now ( plavix was stopped recently

## 2022-10-28 NOTE — ED ADULT NURSE NOTE - OBJECTIVE STATEMENT
Patient alert and oriented X 4 sent from [facility] for bright red blood noted from rectum this morning. Patient denies any rectal or abdominal pain, also denies chest pain, SOB, or dizziness. Patient is on aspirin 81mg daily.   hx COPD, CHF, DM

## 2022-10-28 NOTE — PHARMACOTHERAPY INTERVENTION NOTE - COMMENTS
s/w dr Dunaway - confirmed torsemide dose as 100mg po daily; MD confirmed dose as home dose from [facility] and ok to continue

## 2022-10-28 NOTE — ED PROVIDER NOTE - PROGRESS NOTE DETAILS
DR. Daigle cardiologist is called and left message to his answering service. Dr. Brady gastroenterologist is also paged. Pt has been alert and oriented x 3 abd is soft nontender to palp No more rectal bleed from the rectum since arrival ct angio abd/pelvis is cancelled due to cr 1.5 and pt has no active bleeding hemoglobin 10.6 ekg shows atrial fib pt however has rectal bleed unable to start anticoagulant med. Dr. Dunaway is here eval and admit pt.

## 2022-10-29 NOTE — PROGRESS NOTE ADULT - PROBLEM SELECTOR PLAN 1
present with dark red blood with clots  Colonoscopy on 10/7/22 at [facility] with angioectasia s/p clipping ( record obtained from [facility] and placed in chart)  Hb stable  IV PPI  Monitor H/H  GI consulted  Cardiology consulted   diet advanced, has not bled    PT eval

## 2022-10-29 NOTE — PATIENT PROFILE ADULT - FALL HARM RISK - HARM RISK INTERVENTIONS

## 2022-10-31 NOTE — DISCHARGE NOTE PROVIDER - NSDCCPCAREPLAN_GEN_ALL_CORE_FT
PRINCIPAL DISCHARGE DIAGNOSIS  Diagnosis: Anemia due to acute blood loss  Assessment and Plan of Treatment:       SECONDARY DISCHARGE DIAGNOSES  Diagnosis: Stage 3 chronic kidney disease  Assessment and Plan of Treatment:     Diagnosis: Chronic atrial fibrillation  Assessment and Plan of Treatment:     Diagnosis: Acute on chronic combined systolic and diastolic heart failure  Assessment and Plan of Treatment:     Diagnosis: Rectal bleed  Assessment and Plan of Treatment:     Diagnosis: Atrial fibrillation  Assessment and Plan of Treatment:     Diagnosis: COPD, severe  Assessment and Plan of Treatment:     Diagnosis: Chronic respiratory failure with hypoxia  Assessment and Plan of Treatment:

## 2022-10-31 NOTE — PROGRESS NOTE ADULT - ASSESSMENT
81 years old male with h/o HTN, HLD, DM, CKD, diastolic and systolic HF, COPD on 3-4 NC, CAD s/p CABG in 1999 and PCI with ANIRUDH to RCA on 3/22/22, pul HTN, CIARAN present to ED from [facility] with 1 episode of dark red blood in stool with clots. No abd pain, nausea or vomiting. No hematemesis. Recent admission to [facility] in 09/2022 for CHF exacerbation. Hospital course was complicated by drop in Hb, received PRBC transfusion. Per patient, had EGD and colonoscopy. ? 3 clipps was placed.   Hemodynamically stable, afebrile, sat well with 3L NC. Hb 10.6 ( 10.1 10/15/2022 [facility] transfer paper), Plt 214, Na 130, Cr 1.51, glucose 175, Flu/COVID negative. CXR image reviewed, interstitial pattern noted. Admitted with rectal bleed      # Acute Blood Loss Anemia due to rectal bleed - Colonoscopy on 10/7/22 at [facility] with angioectasia s/p clipping.  Hgb appears to be stable.  Repeat CBC has been stable.  GI input appreciated.  No further invasive intervention planned.   # Chronic respiratory failure with hypoxia. - remains on O2 3-4 L NC at home.  Pt reports to be at baseline.  # Chronic combined systolic and diastolic heart failure. Appears to be euvolemic.  Continue Torsemide.  # Chronic Afib - continue rate control.  not on anticoagulation due to GI bleed.  Discussed risks, benefits and alternatives with pt.  He acknowledges understanding.  He will f/u with GI, cardiology before suming.    # Essential HTN - Monitor BP.  Continue antihypertensives.  # Hyperlipidemia - diet modification.  Continue Statin.  # CKD Stage III - monitor BMP.  Avoid nephrogoxic agents.  # Diabetes Type II - monitor fingersticks.  Insulin coverage for hyperglycemia.  # Inpatient DVT Prophylaxis - Lower extremity intermittent compression devices.     PT recommending d/c home. 
ASSESSMENT:  Ismael is a pleasant 81-year-old gentleman with multiple comorbid medical problems including hypertension, dyslipidemia, chronic kidney disease, type 2 diabetes, chronic combined  heart failure reduced ejection fraction, former smoker, COPD being maintained on supplemental oxygen, two-vessel CABG in 1999, [employer] ILR/pacemaker, ANIRUDH PCI on 3/22/2022 by Dr. Jarrell at [facility], history of obstructive sleep apnea and pulm hypertension recently admitted to [place] for colonoscopy and describes clipping procedure.  He has been off aspirin for about 2 weeks now and remains off antiplatelets completely.  He has been recovering at [facility] but was sent over for the hospital evaluation for bright red blood per rectum.  He feels otherwise fine and denies current chest symptoms.  He seemed to be in asymptomatic A. fib on telemetry.        -Continue atorvastatin/metoprolol   -Continue torsemide for heart failure management.    -restarted daily asa; HCT remains stable 34-35; will follow  -CTA as per GI if HD instability
81 years old male with h/o HTN, HLD, DM, CKD, diastolic and systolic HF, COPD on 3-4 NC, CAD s/p CABG in 1999 and PCI with ANIRUDH to RCA on 3/22/22, pul HTN, CIARAN present to ED from [facility] with 1 episode of dark red blood in stool with clots. No abd pain, nausea or vomiting. No hematemesis. Recent admission to [facility] in 09/2022 for CHF exacerbation. Hospital course was complicated by drop in Hb, received PRBC transfusion. Per patient, had EGD and colonoscopy. ? 3 clipps was placed.   Hemodynamically stable, afebrile, sat well with 3L NC. Hb 10.6 ( 10.1 10/15/2022 [facility] transfer paper), Plt 214, Na 130, Cr 1.51, glucose 175, Flu/COVID negative. CXR image reviewed, interstitial pattern noted. Admitted with rectal bleed      # Acute Blood Loss Anemia due to rectal bleed - Colonoscopy on 10/7/22 at [facility] with angioectasia s/p clipping.  Hgb appears to be stable.  Repeat CBC in am.  GI input appreciated.  No further invasive intervention planned.   # Chronic respiratory failure with hypoxia. - remains on O2 3-4 L NC at home.  Pt reports to be at baseline.  # Chronic combined systolic and diastolic heart failure. Appears to be euvolemic.  Continue Torsemide.  # Chronic Afib - continue rate control.  continue anticoagulation.   # Essential HTN - Monitor BP.  Continue antihypertensives.  # Hyperlipidemia - diet modification.  Continue Statin.  # CKD Stage III - monitor BMP.  Avoid nephrogoxic agents.  # Diabetes Type II - monitor fingersticks.  Insulin coverage for hyperglycemia.  # Inpatient DVT Prophylaxis - Lower extremity intermittent compression devices.     PT evaluation.  If Hgb stable, will consider d/c home.  Pt and wife in agreement. 
81 years old male with h/o HTN, HLD, DM, CKD, diastolic and systolic HF, COPD on 3-4 NC, CAD s/p CABG in 1999 and PCI with ANIRUDH to RCA on 3/22/22, pul HTN, CIARAN present to ED from [facility] with 1 episode of dark red blood in stool with clots. No abd pain, nausea or vomiting. No hematemesis.   Recent admission to [facility] in 09/2022 for CHF exacerbation. Hospital course was complicated by drop in Hb, received PRBC transfusion. Per patient, had EGD and colonoscopy. ? 3 clipps was placed.  Hemodynamically stable, afebrile, sat well with 3L NC. Hb 10.6 ( 10.1 10/15/2022 [facility] transfer paper), Plt 214, Na 130, Cr 1.51, glucose 175, Flu/COVID negative. CXR image reviewed, interstitial pattern noted.       Admitted with rectal bleed
ASSESSMENT:  Ismael is a pleasant 81-year-old gentleman with multiple comorbid medical problems including hypertension, dyslipidemia, chronic kidney disease, type 2 diabetes, chronic combined  heart failure reduced ejection fraction, former smoker, COPD being maintained on supplemental oxygen, two-vessel CABG in 1999, [employer]tr[employer] ILR/pacemaker, ANIRUDH [facility], history of obstructive sleep apnea and pulm hypertension recently admitted to [place] for colonoscopy and describes clipping procedure.  He has been off aspirin for about 2 weeks now and remains off antiplatelets completely.  He has been recovering at [facility] but was sent over for the hospital evaluation for bright red blood per rectum.  He feels otherwise fine and denies current chest symptoms.  He seemed to be in asymptomatic A. fib on telemetry.         -Continue atorvastatin/metoprolol   -Continue torsemide for heart failure management.    -restarted daily asa; HCT remains stable 34-35  -CTA as per GI if HD instability  Afib remains rate controlled.... no AC for now other than asa.  No signs of ischemia or active CHF.  No further acute cardiac intervention at this time; remains HD stable and asymptomatic.  Will sign off for now; please call back with any further questions.  Can f/u with Dr. Jarrell upon d/c.

## 2022-10-31 NOTE — DISCHARGE NOTE PROVIDER - NSDCFUADDINST_GEN_ALL_CORE_FT
It is important to see your primary physician as well as any specialty physicians within the next week to perform a comprehensive medical review.  Call their offices for an appointment as soon as you leave the hospital.  You will also need to see them for renewal of your medications.  If have any difficulty following with a physician, contact the [facility] [phone] or via [email or web address].   To obtain your results, you can access the [facility] Patient Portal at [email or web address].  Your medical issues appear to be stable at this time, but if your symptoms recur or worsen, contact your physicians and/or return to the hospital if necessary.  If you encounter any issues or questions with your medication, call your physicians before stopping the medication.  Do not drive.  Limit your diet to 2 grams of sodium daily.

## 2022-10-31 NOTE — DISCHARGE NOTE NURSING/CASE MANAGEMENT/SOCIAL WORK - PATIENT PORTAL LINK FT
You can access the FollowMyHealth Patient Portal offered by [facility] by registering at the following website: [email or web address]. By joining [facility]’s FollowMyHealth portal, you will also be able to view your health information using other applications (apps) compatible with our system. no chest pain and no edema.

## 2022-10-31 NOTE — PROGRESS NOTE ADULT - SUBJECTIVE AND OBJECTIVE BOX
Patient: TAMMY CAN [id number] 81y Male                            Hospitalist Attending Note    Wife at bedside.  Denies bleeding.  No SOB.  Reports to be at baseline.       ____________________PHYSICAL EXAM:  GENERAL:  NAD Alert and Oriented x 3   HEENT: NCAT  CARDIOVASCULAR:  S1, S2  LUNGS: CTAB  ABDOMEN:  soft, (-) tenderness, (-) distension, (+) bowel sounds, (-) guarding, (-) rebound (-) rigidity  EXTREMITIES:  no cyanosis / clubbing / edema.   ____________________     VITALS:  Vital Signs Last 24 Hrs  T(C): 36.2 (30 Oct 2022 11:15), Max: 36.4 (29 Oct 2022 16:35)  T(F): 97.2 (30 Oct 2022 11:15), Max: 97.5 (29 Oct 2022 16:35)  HR: 86 (30 Oct 2022 11:15) (72 - 96)  BP: 124/76 (30 Oct 2022 11:15) (104/64 - 124/76)  BP(mean): --  RR: 89 (30 Oct 2022 11:15) (17 - 89)  SpO2: 88% (30 Oct 2022 09:10) (74% - 94%)    Parameters below as of 30 Oct 2022 11:15  Patient On (Oxygen Delivery Method): nasal cannula     Daily     Daily Weight in k.8 (30 Oct 2022 05:00)  CAPILLARY BLOOD GLUCOSE      POCT Blood Glucose.: 149 mg/dL (30 Oct 2022 15:21)  POCT Blood Glucose.: 262 mg/dL (30 Oct 2022 11:19)  POCT Blood Glucose.: 119 mg/dL (30 Oct 2022 07:47)  POCT Blood Glucose.: 223 mg/dL (29 Oct 2022 22:47)  POCT Blood Glucose.: 172 mg/dL (29 Oct 2022 17:16)    I&O's Summary    29 Oct 2022 07:01  -  30 Oct 2022 07:00  --------------------------------------------------------  IN: 355 mL / OUT: 800 mL / NET: -445 mL    30 Oct 2022 07:01  -  30 Oct 2022 16:07  --------------------------------------------------------  IN: 600 mL / OUT: 1400 mL / NET: -800 mL        HISTORY:  PAST MEDICAL & SURGICAL HISTORY:  Chronic CHF      Chronic obstructive pulmonary disease with frequent exacerbations      Diabetes      Anemia      Hypertension      Allergies    No Known Allergies    Intolerances       LABS:                        9.9    6.56  )-----------( 147      ( 30 Oct 2022 07:00 )             33.4     1030    135  |  96  |  33<H>  ----------------------------<  107<H>  3.2<L>   |  30  |  1.30    Ca    8.3<L>      30 Oct 2022 07:00  Phos  4.1     10-29  Mg     1.7     10-29    TPro  6.8  /  Alb  2.8<L>  /  TBili  1.7<H>  /  DBili  x   /  AST  30  /  ALT  17  /  AlkPhos  212<H>  10-29      LIVER FUNCTIONS - ( 29 Oct 2022 06:00 )  Alb: 2.8 g/dL / Pro: 6.8 gm/dL / ALK PHOS: 212 U/L / ALT: 17 U/L / AST: 30 U/L / GGT: x                     MEDICATIONS:  MEDICATIONS  (STANDING):  aspirin enteric coated 81 milliGRAM(s) Oral daily  atorvastatin 80 milliGRAM(s) Oral at bedtime  budesonide 160 MICROgram(s)/formoterol 4.5 MICROgram(s) Inhaler 2 Puff(s) Inhalation two times a day  dextrose 5%. 1000 milliLiter(s) (100 mL/Hr) IV Continuous <Continuous>  dextrose 5%. 1000 milliLiter(s) (50 mL/Hr) IV Continuous <Continuous>  dextrose 50% Injectable 25 Gram(s) IV Push once  dextrose 50% Injectable 12.5 Gram(s) IV Push once  dextrose 50% Injectable 25 Gram(s) IV Push once  ferrous    sulfate 325 milliGRAM(s) Oral daily  folic acid 1 milliGRAM(s) Oral daily  glucagon  Injectable 1 milliGRAM(s) IntraMuscular once  insulin lispro (ADMELOG) corrective regimen sliding scale   SubCutaneous Before meals and at bedtime  metoprolol succinate ER 50 milliGRAM(s) Oral daily  pantoprazole    Tablet 40 milliGRAM(s) Oral two times a day  potassium chloride   Powder 40 milliEquivalent(s) Oral once  senna 2 Tablet(s) Oral at bedtime  torsemide 100 milliGRAM(s) Oral daily    MEDICATIONS  (PRN):  acetaminophen     Tablet .. 650 milliGRAM(s) Oral every 6 hours PRN Mild Pain (1 - 3), Moderate Pain (4 - 6)  ALBUTerol    90 MICROgram(s) HFA Inhaler 2 Puff(s) Inhalation every 6 hours PRN Shortness of Breath and/or Wheezing  dextrose Oral Gel 15 Gram(s) Oral once PRN Blood Glucose LESS THAN 70 milliGRAM(s)/deciliter  melatonin 3 milliGRAM(s) Oral at bedtime PRN Insomnia    
Patient is a 81y old  Male who presents with a chief complaint of rectal bleed (28 Oct 2022 16:06)    INTERVAL HPI/OVERNIGHT EVENTS:    MEDICATIONS  (STANDING):  aspirin enteric coated 81 milliGRAM(s) Oral daily  atorvastatin 80 milliGRAM(s) Oral at bedtime  budesonide 160 MICROgram(s)/formoterol 4.5 MICROgram(s) Inhaler 2 Puff(s) Inhalation two times a day  dextrose 5%. 1000 milliLiter(s) (100 mL/Hr) IV Continuous <Continuous>  dextrose 5%. 1000 milliLiter(s) (50 mL/Hr) IV Continuous <Continuous>  dextrose 50% Injectable 25 Gram(s) IV Push once  dextrose 50% Injectable 12.5 Gram(s) IV Push once  dextrose 50% Injectable 25 Gram(s) IV Push once  ferrous    sulfate 325 milliGRAM(s) Oral daily  folic acid 1 milliGRAM(s) Oral daily  glucagon  Injectable 1 milliGRAM(s) IntraMuscular once  insulin lispro (ADMELOG) corrective regimen sliding scale   SubCutaneous Before meals and at bedtime  metoprolol succinate ER 50 milliGRAM(s) Oral daily  pantoprazole    Tablet 40 milliGRAM(s) Oral two times a day  senna 2 Tablet(s) Oral at bedtime  torsemide 100 milliGRAM(s) Oral daily    MEDICATIONS  (PRN):  acetaminophen     Tablet .. 650 milliGRAM(s) Oral every 6 hours PRN Mild Pain (1 - 3), Moderate Pain (4 - 6)  ALBUTerol    90 MICROgram(s) HFA Inhaler 2 Puff(s) Inhalation every 6 hours PRN Shortness of Breath and/or Wheezing  dextrose Oral Gel 15 Gram(s) Oral once PRN Blood Glucose LESS THAN 70 milliGRAM(s)/deciliter  melatonin 3 milliGRAM(s) Oral at bedtime PRN Insomnia    Allergies    No Known Allergies    Intolerances      REVIEW OF SYSTEMS:  All other systems reviewed and are negative    Vital Signs Last 24 Hrs  T(C): 36.7 (29 Oct 2022 04:41), Max: 36.7 (29 Oct 2022 04:41)  T(F): 98.1 (29 Oct 2022 04:41), Max: 98.1 (29 Oct 2022 04:41)  HR: 103 (29 Oct 2022 08:43) (86 - 105)  BP: 90/66 (29 Oct 2022 04:41) (90/66 - 131/71)  BP(mean): --  RR: 18 (29 Oct 2022 04:41) (18 - 20)  SpO2: 90% (29 Oct 2022 08:43) (90% - 96%)    Parameters below as of 29 Oct 2022 04:41  Patient On (Oxygen Delivery Method): BiPAP/CPAP      Daily     Daily   I&O's Summary    CAPILLARY BLOOD GLUCOSE      POCT Blood Glucose.: 126 mg/dL (29 Oct 2022 08:26)  POCT Blood Glucose.: 201 mg/dL (28 Oct 2022 21:30)  POCT Blood Glucose.: 135 mg/dL (28 Oct 2022 17:52)    PHYSICAL EXAM:  GENERAL: NAD,    HEAD:  Atraumatic, Normocephalic  EYES: EOMI, PERRLA, conjunctiva and sclera clear  ENMT: No tonsillar erythema, exudates, or enlargement; Moist mucous membranes, Good dentition, No lesions  NECK: Supple, No JVD, Normal thyroid  NERVOUS SYSTEM:  Alert & Oriented X3, Good concentration; Motor Strength 5/5 B/L upper and lower extremities; DTRs 2+ intact and symmetric  CHEST/LUNG: Clear to percussion bilaterally; No rales, rhonchi, wheezing, or rubs  HEART: Regular rate and rhythm; No murmurs, rubs, or gallops  ABDOMEN: Soft, Nontender, Nondistended; Bowel sounds present  EXTREMITIES:  2+ Peripheral Pulses, No clubbing, cyanosis, or edema  LYMPH: No lymphadenopathy noted  SKIN: No rashes or lesions    Labs                          10.1   8.36  )-----------( 194      ( 29 Oct 2022 06:00 )             34.2     10-29    134<L>  |  94<L>  |  38<H>  ----------------------------<  126<H>  3.4<L>   |  29  |  1.54<H>    Ca    8.9      29 Oct 2022 06:00  Phos  4.1     10-29  Mg     1.7     10-29    TPro  6.8  /  Alb  2.8<L>  /  TBili  1.7<H>  /  DBili  x   /  AST  30  /  ALT  17  /  AlkPhos  212<H>  10-29    PT/INR - ( 28 Oct 2022 10:20 )   PT: 17.6 sec;   INR: 1.47 ratio         PTT - ( 28 Oct 2022 10:20 )  PTT:30.6 sec                    DVT prophylaxis: > Lovenox 40mg SQ daily  > Heparin   > SCD's
Patient seen and examined  Labs reviewed    No bm since admission  No abdominal pain    T(C): 36.2 (10-30-22 @ 11:15), Max: 36.4 (10-29-22 @ 16:35)  HR: 86 (10-30-22 @ 11:15) (72 - 96)  BP: 124/76 (10-30-22 @ 11:15) (104/64 - 124/76)  RR: 89 (10-30-22 @ 11:15) (17 - 89)  SpO2: 88% (10-30-22 @ 09:10) (74% - 94%)    NAD  soft NT ND                          9.9    6.56  )-----------( 147      ( 30 Oct 2022 07:00 )             33.4     10-30    135  |  96  |  33<H>  ----------------------------<  107<H>  3.2<L>   |  30  |  1.30    Ca    8.3<L>      30 Oct 2022 07:00  Phos  4.1     10-29  Mg     1.7     10-29    TPro  6.8  /  Alb  2.8<L>  /  TBili  1.7<H>  /  DBili  x   /  AST  30  /  ALT  17  /  AlkPhos  212<H>  10-29    Impression: Likely resolved post polypectomy bleed.     Recommend:  - regular diet  - trend hgb  - monitor for GI Bleeding    Will sign off. please call back with questions.
                          Patient: TAMMY CAN [id number] 81y Male                            Hospitalist Attending Note    Denies bleeding.  No SOB.  Reports to be at baseline.   Ambulated with PT this am.     ____________________PHYSICAL EXAM:  GENERAL:  NAD Alert and Oriented x 3   HEENT: NCAT  CARDIOVASCULAR:  S1, S2  LUNGS: CTAB  ABDOMEN:  soft, (-) tenderness, (-) distension, (+) bowel sounds, (-) guarding, (-) rebound (-) rigidity  EXTREMITIES:  no cyanosis / clubbing / edema.   ____________________      VITALS:  Vital Signs Last 24 Hrs  T(C): 36.3 (31 Oct 2022 11:47), Max: 36.4 (30 Oct 2022 17:30)  T(F): 97.3 (31 Oct 2022 11:47), Max: 97.5 (30 Oct 2022 17:30)  HR: 105 (31 Oct 2022 11:47) (82 - 105)  BP: 102/61 (31 Oct 2022 11:47) (101/63 - 109/56)  BP(mean): --  RR: 18 (31 Oct 2022 11:47) (18 - 18)  SpO2: 95% (31 Oct 2022 11:47) (89% - 95%)    Parameters below as of 31 Oct 2022 11:47  Patient On (Oxygen Delivery Method): nasal cannula     Daily     Daily Weight in k (31 Oct 2022 05:06)  CAPILLARY BLOOD GLUCOSE      POCT Blood Glucose.: 335 mg/dL (31 Oct 2022 11:58)  POCT Blood Glucose.: 136 mg/dL (31 Oct 2022 08:14)  POCT Blood Glucose.: 309 mg/dL (30 Oct 2022 21:16)  POCT Blood Glucose.: 149 mg/dL (30 Oct 2022 15:21)    I&O's Summary    30 Oct 2022 07:01  -  31 Oct 2022 07:00  --------------------------------------------------------  IN: 720 mL / OUT: 2600 mL / NET: -1880 mL        LABS:                        10.4   6.51  )-----------( 151      ( 31 Oct 2022 06:45 )             35.9     10-31    136  |  95<L>  |  30<H>  ----------------------------<  119<H>  3.6   |  32<H>  |  1.55<H>    Ca    8.6      31 Oct 2022 06:45  Phos  3.3     10-31  Mg     1.9     10-31                    MEDICATIONS:  acetaminophen     Tablet .. 650 milliGRAM(s) Oral every 6 hours PRN  ALBUTerol    90 MICROgram(s) HFA Inhaler 2 Puff(s) Inhalation every 6 hours PRN  aspirin enteric coated 81 milliGRAM(s) Oral daily  atorvastatin 80 milliGRAM(s) Oral at bedtime  budesonide 160 MICROgram(s)/formoterol 4.5 MICROgram(s) Inhaler 2 Puff(s) Inhalation two times a day  dextrose 5%. 1000 milliLiter(s) IV Continuous <Continuous>  dextrose 5%. 1000 milliLiter(s) IV Continuous <Continuous>  dextrose 50% Injectable 25 Gram(s) IV Push once  dextrose 50% Injectable 12.5 Gram(s) IV Push once  dextrose 50% Injectable 25 Gram(s) IV Push once  dextrose Oral Gel 15 Gram(s) Oral once PRN  ferrous    sulfate 325 milliGRAM(s) Oral daily  folic acid 1 milliGRAM(s) Oral daily  glucagon  Injectable 1 milliGRAM(s) IntraMuscular once  insulin lispro (ADMELOG) corrective regimen sliding scale   SubCutaneous Before meals and at bedtime  melatonin 3 milliGRAM(s) Oral at bedtime PRN  metoprolol succinate ER 50 milliGRAM(s) Oral daily  pantoprazole    Tablet 40 milliGRAM(s) Oral two times a day  senna 2 Tablet(s) Oral at bedtime  torsemide 100 milliGRAM(s) Oral daily    
  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 Jarrell at [facility], history of obstructive sleep apnea and pulm hypertension recently admitted to [place] for colonoscopy and describes clipping procedure.  He has been off aspirin for about 2 weeks now and remains off antiplatelets completely.  He has been recovering at [facility] but was sent over for the hospital evaluation for bright red blood per rectum.  He feels otherwise fine and denies current chest symptoms.  He seemed to be in asymptomatic A. fib on telemetry.        PAST MEDICAL & SURGICAL HISTORY:  Chronic CHF  Chronic obstructive pulmonary disease with frequent exacerbations  Diabetes  Anemia  Hypertension    FAMILY HISTORY:  n/a    SOCIAL HISTORY:  SH: stopped smoking 22 years ago  FH: brother- prostate Ca (28 Oct 2022 13:05)    REVIEW OF SYSTEMS:  General:  No wt loss, fevers, chills, night sweats  Eyes:  Good vision, no reported pain  ENT:  No sore throat, pain, runny nose, dysphagia  CV:  No pain, palpitations, hypo/hypertension  Resp:  No dyspnea, cough, tachypnea, wheezing  GI:  No pain, nausea, vomiting, diarrhea, constipation  :  No pain, bleeding, incontinence, nocturia  Muscle:  No pain, weakness  Breast:  No pain, abscess, mass, discharge  Neuro:  No weakness, tingling, memory problems  Psych:  No fatigue, insomnia, mood problems, depression  Endocrine:  No polyuria, polydipsia, cold/heat intolerance  Heme:  No petechiae, ecchymosis, easy bruisability  Skin:  No rash, edema    MEDICATIONS  (STANDING):  aspirin enteric coated 81 milliGRAM(s) Oral daily  atorvastatin 80 milliGRAM(s) Oral at bedtime  budesonide 160 MICROgram(s)/formoterol 4.5 MICROgram(s) Inhaler 2 Puff(s) Inhalation two times a day  ferrous    sulfate 325 milliGRAM(s) Oral daily  folic acid 1 milliGRAM(s) Oral daily  glucagon  Injectable 1 milliGRAM(s) IntraMuscular once  insulin lispro (ADMELOG) corrective regimen sliding scale   SubCutaneous Before meals and at bedtime  metoprolol succinate ER 50 milliGRAM(s) Oral daily  pantoprazole    Tablet 40 milliGRAM(s) Oral two times a day  senna 2 Tablet(s) Oral at bedtime  torsemide 100 milliGRAM(s) Oral daily    MEDICATIONS  (PRN):  acetaminophen     Tablet .. 650 milliGRAM(s) Oral every 6 hours PRN Mild Pain (1 - 3), Moderate Pain (4 - 6)  ALBUTerol    90 MICROgram(s) HFA Inhaler 2 Puff(s) Inhalation every 6 hours PRN Shortness of Breath and/or Wheezing  dextrose Oral Gel 15 Gram(s) Oral once PRN Blood Glucose LESS THAN 70 milliGRAM(s)/deciliter  melatonin 3 milliGRAM(s) Oral at bedtime PRN Insomnia      Allergies  No Known Allergies        PHYSICAL EXAM:  Vital Signs Last 24 Hrs  T(C): 36.2 (30 Oct 2022 11:15), Max: 36.4 (29 Oct 2022 16:35)  T(F): 97.2 (30 Oct 2022 11:15), Max: 97.5 (29 Oct 2022 16:35)  HR: 86 (30 Oct 2022 11:15) (72 - 96)  BP: 124/76 (30 Oct 2022 11:15) (104/64 - 124/76)  RR: 89 (30 Oct 2022 11:15) (17 - 89)  SpO2: 88% (30 Oct 2022 09:10) (74% - 94%)    Parameters below as of 30 Oct 2022 11:15  Patient On (Oxygen Delivery Method): nasal cannula        Tele: AF    Constitutional: well developed, normal appearance, well groomed, well nourished, no deformities and no acute distress.   Eyes: the conjunctiva exhibited no abnormalities and the eyelids demonstrated no xanthelasmas.   HEENT: normal oral mucosa, no oral pallor and no oral cyanosis.   Neck: normal jugular venous A waves present, normal jugular venous V waves present and no jugular venous huynh A waves.   Pulmonary: no respiratory distress, normal respiratory rhythm and effort, no accessory muscle use and lungs were clear to auscultation bilaterally.   Cardiovascular: heart rate and rhythm were normal, normal S1 and S2 and no murmur, gallop, rub, heave or thrill are present.   Abdomen: soft, non-tender.  Musculoskeletal: the gait could not be assessed.   Extremities: no clubbing of the fingernails, no localized cyanosis, no petechial hemorrhages and no ischemic changes.   Skin: normal skin color and pigmentation, no rash, no venous stasis, no skin lesions, no skin ulcer and no xanthoma was observed.   Psychiatric: oriented to person, place, and time, the affect was normal, the mood was normal and not feeling anxious.          LABORATORY:                          9.9    6.56  )-----------( 147      ( 30 Oct 2022 07:00 )             33.4     10-30    135  |  96  |  33<H>  ----------------------------<  107<H>  3.2<L>   |  30  |  1.30    Ca    8.3<L>      30 Oct 2022 07:00  Phos  4.1     10-29  Mg     1.7     10-29    TPro  6.8  /  Alb  2.8<L>  /  TBili  1.7<H>  /  DBili  x   /  AST  30  /  ALT  17  /  AlkPhos  212<H>  10-29          IMAGING:  < from: Xray Chest 1 View-PORTABLE IMMEDIATE (Xray Chest 1 View-PORTABLE IMMEDIATE .) (10.28.22 @ 10:51) >  IMPRESSION: Increased interstitial pattern. Micra pacemaker and left loop   recorder noted.    < end of copied text >      
  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 Jarrell at [facility], history of obstructive sleep apnea and pulm hypertension recently admitted to [facility] for colonoscopy and describes clipping procedure.  He has been off aspirin for about 2 weeks now and remains off antiplatelets completely.  He has been recovering at [facility] but was sent over for the hospital evaluation for bright red blood per rectum.  He feels otherwise fine and denies current chest symptoms.  He seemed to be in asymptomatic A. fib on telemetry.        PAST MEDICAL & SURGICAL HISTORY:  Chronic CHF  Chronic obstructive pulmonary disease with frequent exacerbations  Diabetes  Anemia  Hypertension    FAMILY HISTORY:  n/a    SOCIAL HISTORY:  SH: stopped smoking 22 years ago  FH: brother- prostate Ca (28 Oct 2022 13:05)    REVIEW OF SYSTEMS:  General:  No wt loss, fevers, chills, night sweats  Eyes:  Good vision, no reported pain  ENT:  No sore throat, pain, runny nose, dysphagia  CV:  No pain, palpitations, hypo/hypertension  Resp:  No dyspnea, cough, tachypnea, wheezing  GI:  No pain, nausea, vomiting, diarrhea, constipation  :  No pain, bleeding, incontinence, nocturia  Muscle:  No pain, weakness  Breast:  No pain, abscess, mass, discharge  Neuro:  No weakness, tingling, memory problems  Psych:  No fatigue, insomnia, mood problems, depression  Endocrine:  No polyuria, polydipsia, cold/heat intolerance  Heme:  No petechiae, ecchymosis, easy bruisability  Skin:  No rash, edema      MEDICATIONS  (STANDING):  aspirin enteric coated 81 milliGRAM(s) Oral daily  atorvastatin 80 milliGRAM(s) Oral at bedtime  budesonide 160 MICROgram(s)/formoterol 4.5 MICROgram(s) Inhaler 2 Puff(s) Inhalation two times a day  ferrous    sulfate 325 milliGRAM(s) Oral daily  folic acid 1 milliGRAM(s) Oral daily  glucagon  Injectable 1 milliGRAM(s) IntraMuscular once  insulin lispro (ADMELOG) corrective regimen sliding scale   SubCutaneous Before meals and at bedtime  metoprolol succinate ER 50 milliGRAM(s) Oral daily  pantoprazole    Tablet 40 milliGRAM(s) Oral two times a day  senna 2 Tablet(s) Oral at bedtime  torsemide 100 milliGRAM(s) Oral daily    MEDICATIONS  (PRN):  acetaminophen     Tablet .. 650 milliGRAM(s) Oral every 6 hours PRN Mild Pain (1 - 3), Moderate Pain (4 - 6)  ALBUTerol    90 MICROgram(s) HFA Inhaler 2 Puff(s) Inhalation every 6 hours PRN Shortness of Breath and/or Wheezing  dextrose Oral Gel 15 Gram(s) Oral once PRN Blood Glucose LESS THAN 70 milliGRAM(s)/deciliter  melatonin 3 milliGRAM(s) Oral at bedtime PRN Insomnia    Allergies  No Known Allergies        PHYSICAL EXAM:  Vital Signs Last 24 Hrs  T(C): 36.4 (29 Oct 2022 11:00), Max: 36.7 (29 Oct 2022 04:41)  T(F): 97.5 (29 Oct 2022 11:00), Max: 98.1 (29 Oct 2022 04:41)  HR: 88 (29 Oct 2022 11:00) (86 - 105)  BP: 114/62 (29 Oct 2022 11:00) (90/66 - 130/70)  RR: 18 (29 Oct 2022 11:00) (18 - 20)  SpO2: 96% (29 Oct 2022 11:00) (90% - 96%)          Tele: AF    Constitutional: well developed, normal appearance, well groomed, well nourished, no deformities and no acute distress.   Eyes: the conjunctiva exhibited no abnormalities and the eyelids demonstrated no xanthelasmas.   HEENT: normal oral mucosa, no oral pallor and no oral cyanosis.   Neck: normal jugular venous A waves present, normal jugular venous V waves present and no jugular venous huynh A waves.   Pulmonary: no respiratory distress, normal respiratory rhythm and effort, no accessory muscle use and lungs were clear to auscultation bilaterally.   Cardiovascular: heart rate and rhythm were normal, normal S1 and S2 and no murmur, gallop, rub, heave or thrill are present.   Abdomen: soft, non-tender.  Musculoskeletal: the gait could not be assessed.   Extremities: no clubbing of the fingernails, no localized cyanosis, no petechial hemorrhages and no ischemic changes.   Skin: normal skin color and pigmentation, no rash, no venous stasis, no skin lesions, no skin ulcer and no xanthoma was observed.   Psychiatric: oriented to person, place, and time, the affect was normal, the mood was normal and not feeling anxious.          LABORATORY:                          10.1   8.36  )-----------( 194      ( 29 Oct 2022 06:00 )             34.2   10-29    134<L>  |  94<L>  |  38<H>  ----------------------------<  126<H>  3.4<L>   |  29  |  1.54<H>    Ca    8.9      29 Oct 2022 06:00  Phos  4.1     10-29  Mg     1.7     10-29    TPro  6.8  /  Alb  2.8<L>  /  TBili  1.7<H>  /  DBili  x   /  AST  30  /  ALT  17  /  AlkPhos  212<H>  10-29                   IMAGING:  < from: Xray Chest 1 View-PORTABLE IMMEDIATE (Xray Chest 1 View-PORTABLE IMMEDIATE .) (10.28.22 @ 10:51) >  IMPRESSION: Increased interstitial pattern. Micra pacemaker and left loop   recorder noted.    < end of copied text >

## 2022-10-31 NOTE — DISCHARGE NOTE NURSING/CASE MANAGEMENT/SOCIAL WORK - [id number]_GEN_ALL_CORE
For information on Fall & Injury Prevention, visit: [email or web address] OR  [email or web address] OR  https://www.cdc.gov/steadi/patient.html

## 2022-10-31 NOTE — DISCHARGE NOTE PROVIDER - HOSPITAL COURSE
81 years old male with h/o HTN, HLD, DM, CKD, diastolic and systolic HF, COPD on 3-4 NC, CAD s/p CABG in 1999 and PCI with ANIRUDH to RCA on 3/22/22, pul HTN, CIARAN present to ED from [facility] with 1 episode of dark red blood in stool with clots. No abd pain, nausea or vomiting. No hematemesis. Recent admission to [facility] in 09/2022 for CHF exacerbation. Hospital course was complicated by drop in Hb, received PRBC transfusion. Per patient, had EGD and colonoscopy. ? 3 clipps was placed.   Hemodynamically stable, afebrile, sat well with 3L NC. Hb 10.6 ( 10.1 10/15/2022 [facility] transfer paper), Plt 214, Na 130, Cr 1.51, glucose 175, Flu/COVID negative. CXR image reviewed, interstitial pattern noted. Admitted with rectal bleed      # Acute Blood Loss Anemia due to rectal bleed - Colonoscopy on 10/7/22 at [facility] with angioectasia s/p clipping.  Hgb appears to be stable.  Repeat CBC has been stable.  GI input appreciated.  No further invasive intervention planned.   # Chronic respiratory failure with hypoxia. - remains on O2 3-4 L NC at home.  Pt reports to be at baseline.  # Chronic combined systolic and diastolic heart failure. Appears to be euvolemic.  Continue Torsemide.  # Chronic Afib - continue rate control.  not on anticoagulation due to GI bleed.  Discussed risks, benefits and alternatives with pt.  He acknowledges understanding.  He will f/u with GI, cardiology before suming.    # Essential HTN - Monitor BP.  Continue antihypertensives.  # Hyperlipidemia - diet modification.  Continue Statin.  # CKD Stage III - monitor BMP.  Avoid nephrogoxic agents.  # Diabetes Type II - monitor fingersticks.  Insulin coverage for hyperglycemia.  # Inpatient DVT Prophylaxis - Lower extremity intermittent compression devices.     PT recommending d/c home.   Disposition: Stable for discharge.  Outpatient followup discussed.  Total time spent on discharge is  35  minutes.

## 2022-10-31 NOTE — DISCHARGE NOTE PROVIDER - CARE PROVIDER_API CALL
PMD, Cardiology, Pulmonary, Gastroenterology,   Phone: (   )    -  Fax: (   )    -  Follow Up Time:

## 2022-10-31 NOTE — PHYSICAL THERAPY INITIAL EVALUATION ADULT - ADDITIONAL COMMENTS
pt encountered in bed supine, NAD aaox 3, [place], able to follow v.c,+ cardiac monitor. pt require min a for bed mob and transfers, able to amb with RW x 60 ft, presents with slow rashel, short steps and require rest period sec to SOB, pt amb with mobile 5l/ml O2, educated on breathing ex. step stool x 4, pt has home o2, chair lift and transport chair .pt has 6 steps get into the house with right HR,+ 1 flight of stairs inside to get to bedroom. pt lives with spouse.

## 2022-10-31 NOTE — DISCHARGE NOTE PROVIDER - NSDCMRMEDTOKEN_GEN_ALL_CORE_FT
Albuterol (Eqv-ProAir HFA) 90 mcg/inh inhalation aerosol: 2 puff(s) inhaled every 6 hours, As Needed  aspirin 81 mg oral tablet, chewable: 1 tab(s) orally once a day

## 2023-01-01 ENCOUNTER — INPATIENT (INPATIENT)
Facility: HOSPITAL | Age: 83
LOS: 0 days | End: 2023-05-21
Attending: EMERGENCY MEDICINE | Admitting: EMERGENCY MEDICINE
Payer: MEDICARE

## 2023-01-01 VITALS
HEIGHT: 70 IN | OXYGEN SATURATION: 100 % | SYSTOLIC BLOOD PRESSURE: 55 MMHG | HEART RATE: 118 BPM | WEIGHT: 179.9 LBS | RESPIRATION RATE: 17 BRPM | DIASTOLIC BLOOD PRESSURE: 39 MMHG

## 2023-01-01 VITALS — TEMPERATURE: 98 F

## 2023-01-01 LAB
ALBUMIN SERPL ELPH-MCNC: 4.3 G/DL — SIGNIFICANT CHANGE UP (ref 3.3–5)
ALP SERPL-CCNC: 487 U/L — HIGH (ref 40–120)
ALT FLD-CCNC: 137 U/L — HIGH (ref 12–78)
ANION GAP SERPL CALC-SCNC: 17 MMOL/L — SIGNIFICANT CHANGE UP (ref 5–17)
APPEARANCE UR: CLEAR — SIGNIFICANT CHANGE UP
AST SERPL-CCNC: 264 U/L — HIGH (ref 15–37)
BACTERIA # UR AUTO: ABNORMAL
BILIRUB SERPL-MCNC: 3.9 MG/DL — HIGH (ref 0.2–1.2)
BILIRUB UR-MCNC: ABNORMAL
BUN SERPL-MCNC: 57 MG/DL — HIGH (ref 7–23)
CALCIUM SERPL-MCNC: 9.3 MG/DL — SIGNIFICANT CHANGE UP (ref 8.5–10.1)
CHLORIDE SERPL-SCNC: 98 MMOL/L — SIGNIFICANT CHANGE UP (ref 96–108)
CO2 SERPL-SCNC: 14 MMOL/L — LOW (ref 22–31)
COLOR SPEC: YELLOW — SIGNIFICANT CHANGE UP
CREAT SERPL-MCNC: 3.09 MG/DL — HIGH (ref 0.5–1.3)
DIFF PNL FLD: NEGATIVE — SIGNIFICANT CHANGE UP
EGFR: 20 ML/MIN/1.73M2 — LOW
GAS PNL BLDA: SIGNIFICANT CHANGE UP
GAS PNL BLDA: SIGNIFICANT CHANGE UP
GLUCOSE BLDC GLUCOMTR-MCNC: 153 MG/DL — HIGH (ref 70–99)
GLUCOSE SERPL-MCNC: 126 MG/DL — HIGH (ref 70–99)
GLUCOSE UR QL: 100 MG/DL
HCT VFR BLD CALC: 41.6 % — SIGNIFICANT CHANGE UP (ref 39–50)
HGB BLD-MCNC: 13.1 G/DL — SIGNIFICANT CHANGE UP (ref 13–17)
KETONES UR-MCNC: ABNORMAL
LACTATE SERPL-SCNC: 10.1 MMOL/L — CRITICAL HIGH (ref 0.7–2)
LEUKOCYTE ESTERASE UR-ACNC: ABNORMAL
MCHC RBC-ENTMCNC: 31.5 G/DL — LOW (ref 32–36)
MCHC RBC-ENTMCNC: 39.6 PG — HIGH (ref 27–34)
MCV RBC AUTO: 125.7 FL — HIGH (ref 80–100)
NITRITE UR-MCNC: NEGATIVE — SIGNIFICANT CHANGE UP
PH UR: 6 — SIGNIFICANT CHANGE UP (ref 5–8)
PLATELET # BLD AUTO: 266 K/UL — SIGNIFICANT CHANGE UP (ref 150–400)
POTASSIUM SERPL-MCNC: 6.3 MMOL/L — CRITICAL HIGH (ref 3.5–5.3)
POTASSIUM SERPL-SCNC: 6.3 MMOL/L — CRITICAL HIGH (ref 3.5–5.3)
PROT SERPL-MCNC: 8.8 GM/DL — HIGH (ref 6–8.3)
PROT UR-MCNC: 30 MG/DL
RAPID RVP RESULT: SIGNIFICANT CHANGE UP
RBC # BLD: 3.31 M/UL — LOW (ref 4.2–5.8)
RBC # FLD: 25.1 % — HIGH (ref 10.3–14.5)
RBC CASTS # UR COMP ASSIST: SIGNIFICANT CHANGE UP /HPF (ref 0–4)
SARS-COV-2 RNA SPEC QL NAA+PROBE: SIGNIFICANT CHANGE UP
SODIUM SERPL-SCNC: 129 MMOL/L — LOW (ref 135–145)
SP GR SPEC: 1.02 — SIGNIFICANT CHANGE UP (ref 1.01–1.02)
UROBILINOGEN FLD QL: 4 MG/DL
WBC # BLD: 18.55 K/UL — HIGH (ref 3.8–10.5)
WBC # FLD AUTO: 18.55 K/UL — HIGH (ref 3.8–10.5)
WBC UR QL: SIGNIFICANT CHANGE UP

## 2023-01-01 PROCEDURE — 99291 CRITICAL CARE FIRST HOUR: CPT | Mod: FS

## 2023-01-01 PROCEDURE — 93010 ELECTROCARDIOGRAM REPORT: CPT

## 2023-01-01 PROCEDURE — 71045 X-RAY EXAM CHEST 1 VIEW: CPT | Mod: 26

## 2023-01-01 RX ORDER — AZITHROMYCIN 500 MG/1
500 TABLET, FILM COATED ORAL EVERY 24 HOURS
Refills: 0 | Status: DISCONTINUED | OUTPATIENT
Start: 2023-05-22 | End: 2023-01-01

## 2023-01-01 RX ORDER — PHENYLEPHRINE HYDROCHLORIDE 10 MG/ML
200 INJECTION INTRAVENOUS ONCE
Refills: 0 | Status: COMPLETED | OUTPATIENT
Start: 2023-01-01 | End: 2023-01-01

## 2023-01-01 RX ORDER — CHLORHEXIDINE GLUCONATE 213 G/1000ML
15 SOLUTION TOPICAL EVERY 12 HOURS
Refills: 0 | Status: DISCONTINUED | OUTPATIENT
Start: 2023-01-01 | End: 2023-01-01

## 2023-01-01 RX ORDER — ALTEPLASE 100 MG
100 KIT INTRAVENOUS ONCE
Refills: 0 | Status: DISCONTINUED | OUTPATIENT
Start: 2023-01-01 | End: 2023-01-01

## 2023-01-01 RX ORDER — PANTOPRAZOLE SODIUM 20 MG/1
40 TABLET, DELAYED RELEASE ORAL DAILY
Refills: 0 | Status: DISCONTINUED | OUTPATIENT
Start: 2023-01-01 | End: 2023-01-01

## 2023-01-01 RX ORDER — CHLORHEXIDINE GLUCONATE 213 G/1000ML
1 SOLUTION TOPICAL
Refills: 0 | Status: DISCONTINUED | OUTPATIENT
Start: 2023-01-01 | End: 2023-01-01

## 2023-01-01 RX ORDER — NOREPINEPHRINE BITARTRATE/D5W 8 MG/250ML
0.05 PLASTIC BAG, INJECTION (ML) INTRAVENOUS
Qty: 8 | Refills: 0 | Status: DISCONTINUED | OUTPATIENT
Start: 2023-01-01 | End: 2023-01-01

## 2023-01-01 RX ORDER — VANCOMYCIN HCL 1 G
1000 VIAL (EA) INTRAVENOUS ONCE
Refills: 0 | Status: DISCONTINUED | OUTPATIENT
Start: 2023-01-01 | End: 2023-01-01

## 2023-01-01 RX ORDER — PHENYLEPHRINE HYDROCHLORIDE 10 MG/ML
100 INJECTION INTRAVENOUS ONCE
Refills: 0 | Status: COMPLETED | OUTPATIENT
Start: 2023-01-01 | End: 2023-01-01

## 2023-01-01 RX ORDER — PIPERACILLIN AND TAZOBACTAM 4; .5 G/20ML; G/20ML
3.38 INJECTION, POWDER, LYOPHILIZED, FOR SOLUTION INTRAVENOUS ONCE
Refills: 0 | Status: COMPLETED | OUTPATIENT
Start: 2023-01-01 | End: 2023-01-01

## 2023-01-01 RX ORDER — SODIUM BICARBONATE 1 MEQ/ML
50 SYRINGE (ML) INTRAVENOUS ONCE
Refills: 0 | Status: COMPLETED | OUTPATIENT
Start: 2023-01-01 | End: 2023-01-01

## 2023-01-01 RX ORDER — IPRATROPIUM/ALBUTEROL SULFATE 18-103MCG
3 AEROSOL WITH ADAPTER (GRAM) INHALATION ONCE
Refills: 0 | Status: COMPLETED | OUTPATIENT
Start: 2023-01-01 | End: 2023-01-01

## 2023-01-01 RX ORDER — SODIUM BICARBONATE 1 MEQ/ML
0.18 SYRINGE (ML) INTRAVENOUS
Qty: 150 | Refills: 0 | Status: DISCONTINUED | OUTPATIENT
Start: 2023-01-01 | End: 2023-01-01

## 2023-01-01 RX ORDER — DEXMEDETOMIDINE HYDROCHLORIDE IN 0.9% SODIUM CHLORIDE 4 UG/ML
0.2 INJECTION INTRAVENOUS
Qty: 200 | Refills: 0 | Status: DISCONTINUED | OUTPATIENT
Start: 2023-01-01 | End: 2023-01-01

## 2023-01-01 RX ORDER — CEFTRIAXONE 500 MG/1
1000 INJECTION, POWDER, FOR SOLUTION INTRAMUSCULAR; INTRAVENOUS ONCE
Refills: 0 | Status: DISCONTINUED | OUTPATIENT
Start: 2023-01-01 | End: 2023-01-01

## 2023-01-01 RX ORDER — ROCURONIUM BROMIDE 10 MG/ML
70 VIAL (ML) INTRAVENOUS ONCE
Refills: 0 | Status: COMPLETED | OUTPATIENT
Start: 2023-01-01 | End: 2023-01-01

## 2023-01-01 RX ORDER — FUROSEMIDE 40 MG
40 TABLET ORAL ONCE
Refills: 0 | Status: DISCONTINUED | OUTPATIENT
Start: 2023-01-01 | End: 2023-01-01

## 2023-01-01 RX ORDER — AZITHROMYCIN 500 MG/1
500 TABLET, FILM COATED ORAL ONCE
Refills: 0 | Status: COMPLETED | OUTPATIENT
Start: 2023-01-01 | End: 2023-01-01

## 2023-01-01 RX ORDER — INSULIN HUMAN 100 [IU]/ML
5 INJECTION, SOLUTION SUBCUTANEOUS ONCE
Refills: 0 | Status: COMPLETED | OUTPATIENT
Start: 2023-01-01 | End: 2023-01-01

## 2023-01-01 RX ORDER — ETOMIDATE 2 MG/ML
20 INJECTION INTRAVENOUS ONCE
Refills: 0 | Status: COMPLETED | OUTPATIENT
Start: 2023-01-01 | End: 2023-01-01

## 2023-01-01 RX ORDER — CALCIUM GLUCONATE 100 MG/ML
2 VIAL (ML) INTRAVENOUS ONCE
Refills: 0 | Status: COMPLETED | OUTPATIENT
Start: 2023-01-01 | End: 2023-01-01

## 2023-01-01 RX ORDER — DEXTROSE 50 % IN WATER 50 %
50 SYRINGE (ML) INTRAVENOUS ONCE
Refills: 0 | Status: COMPLETED | OUTPATIENT
Start: 2023-01-01 | End: 2023-01-01

## 2023-01-01 RX ORDER — HEPARIN SODIUM 5000 [USP'U]/ML
5000 INJECTION INTRAVENOUS; SUBCUTANEOUS EVERY 12 HOURS
Refills: 0 | Status: DISCONTINUED | OUTPATIENT
Start: 2023-01-01 | End: 2023-01-01

## 2023-01-01 RX ORDER — AZITHROMYCIN 500 MG/1
TABLET, FILM COATED ORAL
Refills: 0 | Status: DISCONTINUED | OUTPATIENT
Start: 2023-01-01 | End: 2023-01-01

## 2023-01-01 RX ORDER — IPRATROPIUM/ALBUTEROL SULFATE 18-103MCG
3 AEROSOL WITH ADAPTER (GRAM) INHALATION EVERY 6 HOURS
Refills: 0 | Status: DISCONTINUED | OUTPATIENT
Start: 2023-01-01 | End: 2023-01-01

## 2023-01-01 RX ORDER — SODIUM CHLORIDE 9 MG/ML
1000 INJECTION INTRAMUSCULAR; INTRAVENOUS; SUBCUTANEOUS ONCE
Refills: 0 | Status: COMPLETED | OUTPATIENT
Start: 2023-01-01 | End: 2023-01-01

## 2023-01-01 RX ADMIN — Medication 3 MILLILITER(S): at 17:01

## 2023-01-01 RX ADMIN — Medication 7.65 MICROGRAM(S)/KG/MIN: at 17:26

## 2023-01-01 RX ADMIN — ETOMIDATE 20 MILLIGRAM(S): 2 INJECTION INTRAVENOUS at 15:52

## 2023-01-01 RX ADMIN — Medication 3 MILLILITER(S): at 20:09

## 2023-01-01 RX ADMIN — Medication 3 MILLILITER(S): at 17:31

## 2023-01-01 RX ADMIN — DEXMEDETOMIDINE HYDROCHLORIDE IN 0.9% SODIUM CHLORIDE 4.08 MICROGRAM(S)/KG/HR: 4 INJECTION INTRAVENOUS at 17:20

## 2023-01-01 RX ADMIN — PHENYLEPHRINE HYDROCHLORIDE 200 MICROGRAM(S): 10 INJECTION INTRAVENOUS at 16:03

## 2023-01-01 RX ADMIN — INSULIN HUMAN 5 UNIT(S): 100 INJECTION, SOLUTION SUBCUTANEOUS at 18:18

## 2023-01-01 RX ADMIN — PHENYLEPHRINE HYDROCHLORIDE 100 MICROGRAM(S): 10 INJECTION INTRAVENOUS at 16:02

## 2023-01-01 RX ADMIN — AZITHROMYCIN 255 MILLIGRAM(S): 500 TABLET, FILM COATED ORAL at 19:27

## 2023-01-01 RX ADMIN — Medication 40 MILLIGRAM(S): at 18:59

## 2023-01-01 RX ADMIN — PHENYLEPHRINE HYDROCHLORIDE 200 MICROGRAM(S): 10 INJECTION INTRAVENOUS at 16:04

## 2023-01-01 RX ADMIN — PIPERACILLIN AND TAZOBACTAM 200 GRAM(S): 4; .5 INJECTION, POWDER, LYOPHILIZED, FOR SOLUTION INTRAVENOUS at 17:45

## 2023-01-01 RX ADMIN — SODIUM CHLORIDE 1000 MILLILITER(S): 9 INJECTION INTRAMUSCULAR; INTRAVENOUS; SUBCUTANEOUS at 16:00

## 2023-01-01 RX ADMIN — Medication 200 GRAM(S): at 18:30

## 2023-01-01 RX ADMIN — Medication 50 MILLIEQUIVALENT(S): at 16:45

## 2023-01-01 RX ADMIN — HEPARIN SODIUM 5000 UNIT(S): 5000 INJECTION INTRAVENOUS; SUBCUTANEOUS at 18:57

## 2023-01-01 RX ADMIN — Medication 50 MILLIEQUIVALENT(S): at 18:54

## 2023-01-01 RX ADMIN — Medication 70 MILLIGRAM(S): at 15:53

## 2023-01-01 RX ADMIN — Medication 3 MILLILITER(S): at 17:16

## 2023-01-01 RX ADMIN — Medication 50 MILLILITER(S): at 18:14

## 2023-05-21 NOTE — ED ADULT NURSE REASSESSMENT NOTE - NS ED NURSE REASSESS COMMENT FT1
report given to ICU at 2005. Patient beginning to be prepared for transport. Respiratory therapist at bedside. Transport kit obtained. patient was revitalized before transfer, BP noted to be 66/12. Levophed increased to 1.5. Patient BP began to not read. HR dropping down between 55-60s. Agonal breathing noted, pulses present. patient placed on Zoll and remained on cardiac monitor. This RN contacted ARGENTINA Bajwa from ICU with increased concerns of patient wellbeing. ICU team came down at 2024 for reevaluation of patient before transport to ICU. Patient pulse felt weak and thready. Doppler used for better reassessment of cardiac activity. Patient became PEA on monitor, no cardiac activity noted with doppler. ACLS protocol initiated with CPR performed by ICU and ED staff. Code blue started at 2028.

## 2023-05-21 NOTE — ED ADULT NURSE REASSESSMENT NOTE - NS ED NURSE REASSESS COMMENT FT1
Report received from SONIA Osman. patient received on ventilator, on cardiac monitor and levophed drip on 0.21 and precedex running on 0.05. patient has IV azithromycin running. patient IVs intact. gonzalez in place. awaiting to give report to ICU. Urine collected and sent

## 2023-05-21 NOTE — PROVIDER CONTACT NOTE (EICU) - RECOMMENDATIONS
sedation with Precedex ggt, consider adding Fentanyl IV boluses PRN for analgesia;  maintain MAP >60 ; LEvo PRN, titrate or add pressors accordingly  agree with CT CAP; cannot perform contrast study as with poor renal function;  trend BMP, trops, lactate q6hrs;   trend ABg and adjust vent accordingly; may be a chronic retainer; maintin I:E ratio at least 1:3;  obtain EKG, TTE;  agree with trial of bicarb ggt; hyperK treatment;  steroids, nebs;  sepsis w/up in progress; agree with empiric Abx  consider LE Doppler; DVT ppx.

## 2023-05-21 NOTE — ED PROVIDER NOTE - CRITICAL CARE ATTENDING CONTRIBUTION TO CARE
81yo male with PMH COPD on 3LPM baseline home, CHF, HTN, HLD, CAD with PCI/CABG, CKD, afib, pulmonary htn, presenting from home for respiratory distress.  Patient arrives intubated by ems.  Per ems, patient was reportedly in severe respiratory distress and was cyanotic requiring intubation.  Arrived tachycardic, normotensive, unable to obtain o2 sat.  Received in ED with ett 20 at lip, VL confirming balloon above cords and out of place.  ETT removed, patient bagged and reintubated.  Intubation complicated by post intubation hypotension requiring phenylephrine pushes and levophed which improved bp.  Differential includes pna, copd exacerbation, chf exacerbation, acs, pe.  ABG with severe acidosis 2/2 acute? on chronic copd / hypercapnia, and severe lactic acidosis for which bicarb started.  Treated with abx, nebs, hyperkalemia meds.  Will require ICU admission for further care.    Upon my evaluation, this patient had a high probability of imminent or life-threatening deterioration due to hypoxic respiratory failure and shock, which required my direct attention, intervention, and personal management.  The patient has a  medical condition that impairs one or more vital organ systems.  Frequent personal assessment and adjustment of medical interventions was performed.      I have personally provided 90 minutes of critical care time exclusive of time spent on separately billable procedures. Time includes review of laboratory data, radiology results, discussion with consultants, patient and family; monitoring for potential decompensation, as well as time spent retrieving data and reviewing the chart and documenting the visit. Interventions were performed as documented above.

## 2023-05-21 NOTE — ED PROVIDER NOTE - OBJECTIVE STATEMENT
81 y/o M with PMH COPD on 3LPM in the day/CPAP @ night @ home (per wife, resting saturation @90-92, on movement or any exertion pt drops to low 80s @ baseline), CHF on torsemide (recently discontinued spironolactone), HTN, HLD, CAD with PCI/CABG, CKD, afib, pulmonary htn, BIBA for SOB, cyanosis requiring intubation in field, but still hypoxic on arrival; re-intubated by Dr. Morataya on arrival.    Per wife, today woke up with SOB with excessive mucus and coughing/wheezing. no fevers/vomiting/complaints of pain earlier today.

## 2023-05-21 NOTE — H&P ADULT - ASSESSMENT
Pt is 83yo M hx COPD on 3-4LNC at home and CPAP at night (desats to 80s when ambulates), CHF (on diuretics), s/p CABG, CAD with stents (2022), pulm htn, afib, CKD stage 3 presents to ED with respiratory failure. Now with severe lactic acidosis, shock, and  hypoxemic resp failure admitted to ICU for further management.    # Neuro:  -was A/Ox3 prior to intubation in field  - sedation: precedex     #Resp:  // hypoxemia 2/2 pna vs copd exacerbation vs CHF exacerbation vs PE  -cxr without obvious pathology  - POCUS with small RV, A lines bilat and no effusions bilat   - based on US exam and hx, pulm edema less likely   -will tx for pna with CAP covg, steroids, and nebs   -PaO2 ok on ABG with sat on monitor reading 70%. vital cart sat reading 90% on 100%. 2/2 severe acidosis and shock? will check methb level (although no recent exposures to explain)  -f/u CT chest  -w/ BONNY on CKD and hx of productive cough, small RV on echo, lower suspicion for PE and will hold off on CTA for PE  - low Ppeak and plat pressures in ED without evidence of autoPEEP. decrease I;E time and avoid auto-Peep and breath stacking w/ hx of severe COPD     #CV:  // distributive shock?  -initially HD stable but became HD unstable after intubation  -s/p 1L IVF and started on Levo drip  -difficult POCUS exam in ED w/plethoric IVC and possible reduced EF (hx CHF) and nl RV size  - severe lactic acidosis 2/2 shock? improving.   - with dry lungs on US, cardiogenic shock less likely  -trops with demand ischemia? EKG with RBBB. not complaining of chest pain prior to event. cont to trend trops and EKG   - MAP goal>65    #GI:  //transaminitis from HD instability  -f/u CT abd and trend LFTs  -Diet: npo  - GI ppx: protonix  - Bowel regimen    #Renal:  // severe lactic acidosis  and hyperK  -2/2 circulatory shock from HD instability?  - fluids: s/p 1L. cont with 1L isotonic bicarb for IVF resucitation and acidosis   -s/p hyperK coctail in ED. f/u repeat BMP     //BONNY on CKD   -ATN vs prerenal?  - judicious fluids as above   - gonzalez, cont to monitor strict I/Os  - replete lytes as appropriate       #ID:  // sepsis 2/2 pna?  - leukocytosis to 18  -CAP covg w/ ceftriaxone and azithro and f/u pan cx      #Heme:  //DVT ppx  - cbc stable  -SCDs/chemoppx: HSQ    #Endo:  //sliding scale   - maintaining goal glucose<180 with ISS  - cont to monitor FS    #ethics:  -as per wife pt would want "everything done"  -full code     case and plan discussed with eICU attending and ED staff

## 2023-05-21 NOTE — H&P ADULT - NSHPLABSRESULTS_GEN_ALL_CORE
LABS:                      13.1   18.55 )-----------( 266      ( 21 May 2023 16:00 )             41.6     05-21    129<L>  |  98  |  57<H>  ----------------------------<  126<H>  6.3<HH>   |  14<L>  |  3.09<H>    Ca    9.3      21 May 2023 17:39  Mg     2.9     05-21    TPro  8.8<H>  /  Alb  4.3  /  TBili  3.9<H>  /  DBili  x   /  AST  264<H>  /  ALT  137<H>  /  AlkPhos  487<H>  05-21

## 2023-05-21 NOTE — ED ADULT TRIAGE NOTE - CADM TRG TX PRIOR TO ARRIVAL
ivhl#18 l arm, ett 7.5 in place, epi 0.3, decadron 10mgivp, etomidate 20 mg ivp/medications/saline lock

## 2023-05-21 NOTE — ED ADULT NURSE REASSESSMENT NOTE - NS ED NURSE REASSESS COMMENT FT1
Patient noted with BP of 57/31 and map 40. As per orders levophed increased to 0.21. Doxepin Pregnancy And Lactation Text: This medication is Pregnancy Category C and it isn't known if it is safe during pregnancy. It is also excreted in breast milk and breast feeding isn't recommended. Thalidomide Counseling: I discussed with the patient the risks of thalidomide including but not limited to birth defects, anxiety, weakness, chest pain, dizziness, cough and severe allergy. Sarecycline Pregnancy And Lactation Text: This medication is Pregnancy Category D and not consider safe during pregnancy. It is also excreted in breast milk. Prednisone Counseling:  I discussed with the patient the risks of prolonged use of prednisone including but not limited to weight gain, insomnia, osteoporosis, mood changes, diabetes, susceptibility to infection, glaucoma and high blood pressure.  In cases where prednisone use is prolonged, patients should be monitored with blood pressure checks, serum glucose levels and an eye exam.  Additionally, the patient may need to be placed on GI prophylaxis, PCP prophylaxis, and calcium and vitamin D supplementation and/or a bisphosphonate.  The patient verbalized understanding of the proper use and the possible adverse effects of prednisone.  All of the patient's questions and concerns were addressed. Enbrel Pregnancy And Lactation Text: This medication is Pregnancy Category B and is considered safe during pregnancy. It is unknown if this medication is excreted in breast milk. Eucrisa Counseling: Patient may experience a mild burning sensation during topical application. Eucrisa is not approved in children less than 2 years of age. Eucrisa Pregnancy And Lactation Text: This medication has not been assigned a Pregnancy Risk Category but animal studies failed to show danger with the topical medication. It is unknown if the medication is excreted in breast milk. Enbrel Counseling:  I discussed with the patient the risks of etanercept including but not limited to myelosuppression, immunosuppression, autoimmune hepatitis, demyelinating diseases, lymphoma, and infections.  The patient understands that monitoring is required including a PPD at baseline and must alert us or the primary physician if symptoms of infection or other concerning signs are noted. Bactrim Pregnancy And Lactation Text: This medication is Pregnancy Category D and is known to cause fetal risk.  It is also excreted in breast milk. Glycopyrrolate Counseling:  I discussed with the patient the risks of glycopyrrolate including but not limited to skin rash, drowsiness, dry mouth, difficulty urinating, and blurred vision. Prednisone Pregnancy And Lactation Text: This medication is Pregnancy Category C and it isn't know if it is safe during pregnancy. This medication is excreted in breast milk. Thalidomide Pregnancy And Lactation Text: This medication is Pregnancy Category X and is absolutely contraindicated during pregnancy. It is unknown if it is excreted in breast milk. Hydroxyzine Counseling: Patient advised that the medication is sedating and not to drive a car after taking this medication.  Patient informed of potential adverse effects including but not limited to dry mouth, urinary retention, and blurry vision.  The patient verbalized understanding of the proper use and possible adverse effects of hydroxyzine.  All of the patient's questions and concerns were addressed. Tetracycline Counseling: Patient counseled regarding possible photosensitivity and increased risk for sunburn.  Patient instructed to avoid sunlight, if possible.  When exposed to sunlight, patients should wear protective clothing, sunglasses, and sunscreen.  The patient was instructed to call the office immediately if the following severe adverse effects occur:  hearing changes, easy bruising/bleeding, severe headache, or vision changes.  The patient verbalized understanding of the proper use and possible adverse effects of tetracycline.  All of the patient's questions and concerns were addressed. Patient understands to avoid pregnancy while on therapy due to potential birth defects. Humira Counseling:  I discussed with the patient the risks of adalimumab including but not limited to myelosuppression, immunosuppression, autoimmune hepatitis, demyelinating diseases, lymphoma, and serious infections.  The patient understands that monitoring is required including a PPD at baseline and must alert us or the primary physician if symptoms of infection or other concerning signs are noted. Topical Clindamycin Pregnancy And Lactation Text: This medication is Pregnancy Category B and is considered safe during pregnancy. It is unknown if it is excreted in breast milk. Tremfya Counseling: I discussed with the patient the risks of guselkumab including but not limited to immunosuppression, serious infections, worsening of inflammatory bowel disease and drug reactions.  The patient understands that monitoring is required including a PPD at baseline and must alert us or the primary physician if symptoms of infection or other concerning signs are noted. Hydroquinone Counseling:  Patient advised that medication may result in skin irritation, lightening (hypopigmentation), dryness, and burning.  In the event of skin irritation, the patient was advised to reduce the amount of the drug applied or use it less frequently.  Rarely, spots that are treated with hydroquinone can become darker (pseudoochronosis).  Should this occur, patient instructed to stop medication and call the office. The patient verbalized understanding of the proper use and possible adverse effects of hydroquinone.  All of the patient's questions and concerns were addressed. Taltz Pregnancy And Lactation Text: The risk during pregnancy and breastfeeding is uncertain with this medication. Topical Sulfur Applications Counseling: Topical Sulfur Counseling: Patient counseled that this medication may cause skin irritation or allergic reactions.  In the event of skin irritation, the patient was advised to reduce the amount of the drug applied or use it less frequently.   The patient verbalized understanding of the proper use and possible adverse effects of topical sulfur application.  All of the patient's questions and concerns were addressed. Glycopyrrolate Pregnancy And Lactation Text: This medication is Pregnancy Category B and is considered safe during pregnancy. It is unknown if it is excreted breast milk. Cephalexin Counseling: I counseled the patient regarding use of cephalexin as an antibiotic for prophylactic and/or therapeutic purposes. Cephalexin (commonly prescribed under brand name Keflex) is a cephalosporin antibiotic which is active against numerous classes of bacteria, including most skin bacteria. Side effects may include nausea, diarrhea, gastrointestinal upset, rash, hives, yeast infections, and in rare cases, hepatitis, kidney disease, seizures, fever, confusion, neurologic symptoms, and others. Patients with severe allergies to penicillin medications are cautioned that there is about a 10% incidence of cross-reactivity with cephalosporins. When possible, patients with penicillin allergies should use alternatives to cephalosporins for antibiotic therapy. Hydroxychloroquine Counseling:  I discussed with the patient that a baseline ophthalmologic exam is needed at the start of therapy and every year thereafter while on therapy. A CBC may also be warranted for monitoring.  The side effects of this medication were discussed with the patient, including but not limited to agranulocytosis, aplastic anemia, seizures, rashes, retinopathy, and liver toxicity. Patient instructed to call the office should any adverse effect occur.  The patient verbalized understanding of the proper use and possible adverse effects of Plaquenil.  All the patient's questions and concerns were addressed. Cephalexin Pregnancy And Lactation Text: This medication is Pregnancy Category B and considered safe during pregnancy.  It is also excreted in breast milk but can be used safely for shorter doses. Hydroxyzine Pregnancy And Lactation Text: This medication is not safe during pregnancy and should not be taken. It is also excreted in breast milk and breast feeding isn't recommended. Valtrex Counseling: I discussed with the patient the risks of valacyclovir including but not limited to kidney damage, nausea, vomiting and severe allergy.  The patient understands that if the infection seems to be worsening or is not improving, they are to call. Topical Sulfur Applications Pregnancy And Lactation Text: This medication is Pregnancy Category C and has an unknown safety profile during pregnancy. It is unknown if this topical medication is excreted in breast milk. Xeljanz Counseling: I discussed with the patient the risks of Xeljanz therapy including increased risk of infection, liver issues, headache, diarrhea, or cold symptoms. Live vaccines should be avoided. They were instructed to call if they have any problems. Imiquimod Counseling:  I discussed with the patient the risks of imiquimod including but not limited to erythema, scaling, itching, weeping, crusting, and pain.  Patient understands that the inflammatory response to imiquimod is variable from person to person and was educated regarded proper titration schedule.  If flu-like symptoms develop, patient knows to discontinue the medication and contact us. Valtrex Pregnancy And Lactation Text: this medication is Pregnancy Category B and is considered safe during pregnancy. This medication is not directly found in breast milk but it's metabolite acyclovir is present. Clindamycin Counseling: I counseled the patient regarding use of clindamycin as an antibiotic for prophylactic and/or therapeutic purposes. Clindamycin is active against numerous classes of bacteria, including skin bacteria. Side effects may include nausea, diarrhea, gastrointestinal upset, rash, hives, yeast infections, and in rare cases, colitis. Hydroxychloroquine Pregnancy And Lactation Text: This medication has been shown to cause fetal harm but it isn't assigned a Pregnancy Risk Category. There are small amounts excreted in breast milk. Ilumya Counseling: I discussed with the patient the risks of tildrakizumab including but not limited to immunosuppression, malignancy, posterior leukoencephalopathy syndrome, and serious infections.  The patient understands that monitoring is required including a PPD at baseline and must alert us or the primary physician if symptoms of infection or other concerning signs are noted. Albendazole Counseling:  I discussed with the patient the risks of albendazole including but not limited to cytopenia, kidney damage, nausea/vomiting and severe allergy.  The patient understands that this medication is being used in an off-label manner. Acitretin Counseling:  I discussed with the patient the risks of acitretin including but not limited to hair loss, dry lips/skin/eyes, liver damage, hyperlipidemia, depression/suicidal ideation, photosensitivity.  Serious rare side effects can include but are not limited to pancreatitis, pseudotumor cerebri, bony changes, clot formation/stroke/heart attack.  Patient understands that alcohol is contraindicated since it can result in liver toxicity and significantly prolong the elimination of the drug by many years. Zyclara Counseling:  I discussed with the patient the risks of imiquimod including but not limited to erythema, scaling, itching, weeping, crusting, and pain.  Patient understands that the inflammatory response to imiquimod is variable from person to person and was educated regarded proper titration schedule.  If flu-like symptoms develop, patient knows to discontinue the medication and contact us. Detail Level: Detailed Imiquimod Pregnancy And Lactation Text: This medication is Pregnancy Category C. It is unknown if this medication is excreted in breast milk. Clindamycin Pregnancy And Lactation Text: This medication can be used in pregnancy if certain situations. Clindamycin is also present in breast milk. Fluconazole Counseling:  Patient counseled regarding adverse effects of fluconazole including but not limited to headache, diarrhea, nausea, upset stomach, liver function test abnormalities, taste disturbance, and stomach pain.  There is a rare possibility of liver failure that can occur when taking fluconazole.  The patient understands that monitoring of LFTs and kidney function test may be required, especially at baseline. The patient verbalized understanding of the proper use and possible adverse effects of fluconazole.  All of the patient's questions and concerns were addressed. Nsaids Counseling: NSAID Counseling: I discussed with the patient that NSAIDs should be taken with food. Prolonged use of NSAIDs can result in the development of stomach ulcers.  Patient advised to stop taking NSAIDs if abdominal pain occurs.  The patient verbalized understanding of the proper use and possible adverse effects of NSAIDs.  All of the patient's questions and concerns were addressed. Use Enhanced Medication Counseling?: No Fluconazole Pregnancy And Lactation Text: This medication is Pregnancy Category C and it isn't know if it is safe during pregnancy. It is also excreted in breast milk. Acitretin Pregnancy And Lactation Text: This medication is Pregnancy Category X and should not be given to women who are pregnant or may become pregnant in the future. This medication is excreted in breast milk. Albendazole Pregnancy And Lactation Text: This medication is Pregnancy Category C and it isn't known if it is safe during pregnancy. It is also excreted in breast milk. Xelservandoz Pregnancy And Lactation Text: This medication is Pregnancy Category D and is not considered safe during pregnancy.  The risk during breast feeding is also uncertain. Infliximab Counseling:  I discussed with the patient the risks of infliximab including but not limited to myelosuppression, immunosuppression, autoimmune hepatitis, demyelinating diseases, lymphoma, and serious infections.  The patient understands that monitoring is required including a PPD at baseline and must alert us or the primary physician if symptoms of infection or other concerning signs are noted. Ivermectin Counseling:  Patient instructed to take medication on an empty stomach with a full glass of water.  Patient informed of potential adverse effects including but not limited to nausea, diarrhea, dizziness, itching, and swelling of the extremities or lymph nodes.  The patient verbalized understanding of the proper use and possible adverse effects of ivermectin.  All of the patient's questions and concerns were addressed. Nsaids Pregnancy And Lactation Text: These medications are considered safe up to 30 weeks gestation. It is excreted in breast milk. Doxycycline Counseling:  Patient counseled regarding possible photosensitivity and increased risk for sunburn.  Patient instructed to avoid sunlight, if possible.  When exposed to sunlight, patients should wear protective clothing, sunglasses, and sunscreen.  The patient was instructed to call the office immediately if the following severe adverse effects occur:  hearing changes, easy bruising/bleeding, severe headache, or vision changes.  The patient verbalized understanding of the proper use and possible adverse effects of doxycycline.  All of the patient's questions and concerns were addressed. Xolair Counseling:  Patient informed of potential adverse effects including but not limited to fever, muscle aches, rash and allergic reactions.  The patient verbalized understanding of the proper use and possible adverse effects of Xolair.  All of the patient's questions and concerns were addressed. Griseofulvin Counseling:  I discussed with the patient the risks of griseofulvin including but not limited to photosensitivity, cytopenia, liver damage, nausea/vomiting and severe allergy.  The patient understands that this medication is best absorbed when taken with a fatty meal (e.g., ice cream or french fries). Bexarotene Counseling:  I discussed with the patient the risks of bexarotene including but not limited to hair loss, dry lips/skin/eyes, liver abnormalities, hyperlipidemia, pancreatitis, depression/suicidal ideation, photosensitivity, drug rash/allergic reactions, hypothyroidism, anemia, leukopenia, infection, cataracts, and teratogenicity.  Patient understands that they will need regular blood tests to check lipid profile, liver function tests, white blood cell count, thyroid function tests and pregnancy test if applicable. Minoxidil Counseling: Minoxidil is a topical medication which can increase blood flow where it is applied. It is uncertain how this medication increases hair growth. Side effects are uncommon and include stinging and allergic reactions. Arava Counseling:  Patient counseled regarding adverse effects of Arava including but not limited to nausea, vomiting, abnormalities in liver function tests. Patients may develop mouth sores, rash, diarrhea, and abnormalities in blood counts. The patient understands that monitoring is required including LFTs and blood counts.  There is a rare possibility of scarring of the liver and lung problems that can occur when taking methotrexate. Persistent nausea, loss of appetite, pale stools, dark urine, cough, and shortness of breath should be reported immediately. Patient advised to discontinue Arava treatment and consult with a physician prior to attempting conception. The patient will have to undergo a treatment to eliminate Arava from the body prior to conception. Doxycycline Pregnancy And Lactation Text: This medication is Pregnancy Category D and not consider safe during pregnancy. It is also excreted in breast milk but is considered safe for shorter treatment courses. Isotretinoin Counseling: Patient should get monthly blood tests, not donate blood, not drive at night if vision affected, not share medication, and not undergo elective surgery for 6 months after tx completed. Side effects reviewed, pt to contact office should one occur. Odomzo Counseling- I discussed with the patient the risks of Odomzo including but not limited to nausea, vomiting, diarrhea, constipation, weight loss, changes in the sense of taste, decreased appetite, muscle spasms, and hair loss.  The patient verbalized understanding of the proper use and possible adverse effects of Odomzo.  All of the patient's questions and concerns were addressed. Xolair Pregnancy And Lactation Text: This medication is Pregnancy Category B and is considered safe during pregnancy. This medication is excreted in breast milk. Bexarotene Pregnancy And Lactation Text: This medication is Pregnancy Category X and should not be given to women who are pregnant or may become pregnant. This medication should not be used if you are breast feeding. Erythromycin Counseling:  I discussed with the patient the risks of erythromycin including but not limited to GI upset, allergic reaction, drug rash, diarrhea, increase in liver enzymes, and yeast infections. Griseofulvin Pregnancy And Lactation Text: This medication is Pregnancy Category X and is known to cause serious birth defects. It is unknown if this medication is excreted in breast milk but breast feeding should be avoided. Itraconazole Counseling:  I discussed with the patient the risks of itraconazole including but not limited to liver damage, nausea/vomiting, neuropathy, and severe allergy.  The patient understands that this medication is best absorbed when taken with acidic beverages such as non-diet cola or ginger ale.  The patient understands that monitoring is required including baseline LFTs and repeat LFTs at intervals.  The patient understands that they are to contact us or the primary physician if concerning signs are noted. Isotretinoin Pregnancy And Lactation Text: This medication is Pregnancy Category X and is considered extremely dangerous during pregnancy. It is unknown if it is excreted in breast milk. Picato Counseling:  I discussed with the patient the risks of Picato including but not limited to erythema, scaling, itching, weeping, crusting, and pain. Otezla Counseling: The side effects of Otezla were discussed with the patient, including but not limited to worsening or new depression, weight loss, diarrhea, nausea, upper respiratory tract infection, and headache. Patient instructed to call the office should any adverse effect occur.  The patient verbalized understanding of the proper use and possible adverse effects of Otezla.  All the patient's questions and concerns were addressed. Rituxan Counseling:  I discussed with the patient the risks of Rituxan infusions. Side effects can include infusion reactions, severe drug rashes including mucocutaneous reactions, reactivation of latent hepatitis and other infections and rarely progressive multifocal leukoencephalopathy.  All of the patient's questions and concerns were addressed. Erythromycin Pregnancy And Lactation Text: This medication is Pregnancy Category B and is considered safe during pregnancy. It is also excreted in breast milk. High Dose Vitamin A Counseling: Side effects reviewed, pt to contact office should one occur. Benzoyl Peroxide Pregnancy And Lactation Text: This medication is Pregnancy Category C. It is unknown if benzoyl peroxide is excreted in breast milk. Siliq Counseling:  I discussed with the patient the risks of Siliq including but not limited to new or worsening depression, suicidal thoughts and behavior, immunosuppression, malignancy, posterior leukoencephalopathy syndrome, and serious infections.  The patient understands that monitoring is required including a PPD at baseline and must alert us or the primary physician if symptoms of infection or other concerning signs are noted. There is also a special program designed to monitor depression which is required with Siliq. Azathioprine Pregnancy And Lactation Text: This medication is Pregnancy Category D and isn't considered safe during pregnancy. It is unknown if this medication is excreted in breast milk. Clofazimine Counseling:  I discussed with the patient the risks of clofazimine including but not limited to skin and eye pigmentation, liver damage, nausea/vomiting, gastrointestinal bleeding and allergy. Rituxan Pregnancy And Lactation Text: This medication is Pregnancy Category C and it isn't know if it is safe during pregnancy. It is unknown if this medication is excreted in breast milk but similar antibodies are known to be excreted. Clofazimine Pregnancy And Lactation Text: This medication is Pregnancy Category C and isn't considered safe during pregnancy. It is excreted in breast milk. Protopic Counseling: Patient may experience a mild burning sensation during topical application. Protopic is not approved in children less than 2 years of age. There have been case reports of hematologic and skin malignancies in patients using topical calcineurin inhibitors although causality is questionable. Azathioprine Counseling:  I discussed with the patient the risks of azathioprine including but not limited to myelosuppression, immunosuppression, hepatotoxicity, lymphoma, and infections.  The patient understands that monitoring is required including baseline LFTs, Creatinine, possible TPMP genotyping and weekly CBCs for the first month and then every 2 weeks thereafter.  The patient verbalized understanding of the proper use and possible adverse effects of azathioprine.  All of the patient's questions and concerns were addressed. Otezla Pregnancy And Lactation Text: This medication is Pregnancy Category C and it isn't known if it is safe during pregnancy. It is unknown if it is excreted in breast milk. Metronidazole Counseling:  I discussed with the patient the risks of metronidazole including but not limited to seizures, nausea/vomiting, a metallic taste in the mouth, nausea/vomiting and severe allergy. Ketoconazole Counseling:   Patient counseled regarding improving absorption with orange juice.  Adverse effects include but are not limited to breast enlargement, headache, diarrhea, nausea, upset stomach, liver function test abnormalities, taste disturbance, and stomach pain.  There is a rare possibility of liver failure that can occur when taking ketoconazole. The patient understands that monitoring of LFTs may be required, especially at baseline. The patient verbalized understanding of the proper use and possible adverse effects of ketoconazole.  All of the patient's questions and concerns were addressed. Oxybutynin Counseling:  I discussed with the patient the risks of oxybutynin including but not limited to skin rash, drowsiness, dry mouth, difficulty urinating, and blurred vision. Metronidazole Pregnancy And Lactation Text: This medication is Pregnancy Category B and considered safe during pregnancy.  It is also excreted in breast milk. Cellcept Counseling:  I discussed with the patient the risks of mycophenolate mofetil including but not limited to infection/immunosuppression, GI upset, hypokalemia, hypercholesterolemia, bone marrow suppression, lymphoproliferative disorders, malignancy, GI ulceration/bleed/perforation, colitis, interstitial lung disease, kidney failure, progressive multifocal leukoencephalopathy, and birth defects.  The patient understands that monitoring is required including a baseline creatinine and regular CBC testing. In addition, patient must alert us immediately if symptoms of infection or other concerning signs are noted. Carac Counseling:  I discussed with the patient the risks of Carac including but not limited to erythema, scaling, itching, weeping, crusting, and pain. Carac Pregnancy And Lactation Text: This medication is Pregnancy Category X and contraindicated in pregnancy and in women who may become pregnant. It is unknown if this medication is excreted in breast milk. Colchicine Counseling:  Patient counseled regarding adverse effects including but not limited to stomach upset (nausea, vomiting, stomach pain, or diarrhea).  Patient instructed to limit alcohol consumption while taking this medication.  Colchicine may reduce blood counts especially with prolonged use.  The patient understands that monitoring of kidney function and blood counts may be required, especially at baseline. The patient verbalized understanding of the proper use and possible adverse effects of colchicine.  All of the patient's questions and concerns were addressed. High Dose Vitamin A Pregnancy And Lactation Text: High dose vitamin A therapy is contraindicated during pregnancy and breast feeding. Protopic Pregnancy And Lactation Text: This medication is Pregnancy Category C. It is unknown if this medication is excreted in breast milk when applied topically. Solaraze Counseling:  I discussed with the patient the risks of Solaraze including but not limited to erythema, scaling, itching, weeping, crusting, and pain. Ketoconazole Pregnancy And Lactation Text: This medication is Pregnancy Category C and it isn't know if it is safe during pregnancy. It is also excreted in breast milk and breast feeding isn't recommended. Minocycline Counseling: Patient advised regarding possible photosensitivity and discoloration of the teeth, skin, lips, tongue and gums.  Patient instructed to avoid sunlight, if possible.  When exposed to sunlight, patients should wear protective clothing, sunglasses, and sunscreen.  The patient was instructed to call the office immediately if the following severe adverse effects occur:  hearing changes, easy bruising/bleeding, severe headache, or vision changes.  The patient verbalized understanding of the proper use and possible adverse effects of minocycline.  All of the patient's questions and concerns were addressed. Cimzia Counseling:  I discussed with the patient the risks of Cimzia including but not limited to immunosuppression, allergic reactions and infections.  The patient understands that monitoring is required including a PPD at baseline and must alert us or the primary physician if symptoms of infection or other concerning signs are noted. Simponi Counseling:  I discussed with the patient the risks of golimumab including but not limited to myelosuppression, immunosuppression, autoimmune hepatitis, demyelinating diseases, lymphoma, and serious infections.  The patient understands that monitoring is required including a PPD at baseline and must alert us or the primary physician if symptoms of infection or other concerning signs are noted. 5-Fu Counseling: 5-Fluorouracil Counseling:  I discussed with the patient the risks of 5-fluorouracil including but not limited to erythema, scaling, itching, weeping, crusting, and pain. Dapsone Counseling: I discussed with the patient the risks of dapsone including but not limited to hemolytic anemia, agranulocytosis, rashes, methemoglobinemia, kidney failure, peripheral neuropathy, headaches, GI upset, and liver toxicity.  Patients who start dapsone require monitoring including baseline LFTs and weekly CBCs for the first month, then every month thereafter.  The patient verbalized understanding of the proper use and possible adverse effects of dapsone.  All of the patient's questions and concerns were addressed. Cimzia Pregnancy And Lactation Text: This medication crosses the placenta but can be considered safe in certain situations. Cimzia may be excreted in breast milk. Terbinafine Counseling: Patient counseling regarding adverse effects of terbinafine including but not limited to headache, diarrhea, rash, upset stomach, liver function test abnormalities, itching, taste/smell disturbance, nausea, abdominal pain, and flatulence.  There is a rare possibility of liver failure that can occur when taking terbinafine.  The patient understands that a baseline LFT and kidney function test may be required. The patient verbalized understanding of the proper use and possible adverse effects of terbinafine.  All of the patient's questions and concerns were addressed. Birth Control Pills Counseling: Birth Control Pill Counseling: I discussed with the patient the potential side effects of OCPs including but not limited to increased risk of stroke, heart attack, thrombophlebitis, deep venous thrombosis, hepatic adenomas, breast changes, GI upset, headaches, and depression.  The patient verbalized understanding of the proper use and possible adverse effects of OCPs. All of the patient's questions and concerns were addressed. Terbinafine Pregnancy And Lactation Text: This medication is Pregnancy Category B and is considered safe during pregnancy. It is also excreted in breast milk and breast feeding isn't recommended. Benzoyl Peroxide Counseling: Patient counseled that medicine may cause skin irritation and bleach clothing.  In the event of skin irritation, the patient was advised to reduce the amount of the drug applied or use it less frequently.   The patient verbalized understanding of the proper use and possible adverse effects of benzoyl peroxide.  All of the patient's questions and concerns were addressed. Cyclophosphamide Counseling:  I discussed with the patient the risks of cyclophosphamide including but not limited to hair loss, hormonal abnormalities, decreased fertility, abdominal pain, diarrhea, nausea and vomiting, bone marrow suppression and infection. The patient understands that monitoring is required while taking this medication. Solaraze Pregnancy And Lactation Text: This medication is Pregnancy Category B and is considered safe. There is some data to suggest avoiding during the third trimester. It is unknown if this medication is excreted in breast milk. Skyrizi Counseling: I discussed with the patient the risks of risankizumab-rzaa including but not limited to immunosuppression, and serious infections.  The patient understands that monitoring is required including a PPD at baseline and must alert us or the primary physician if symptoms of infection or other concerning signs are noted. Drysol Counseling:  I discussed with the patient the risks of drysol/aluminum chloride including but not limited to skin rash, itching, irritation, burning. Birth Control Pills Pregnancy And Lactation Text: This medication should be avoided if pregnant and for the first 30 days post-partum. Quinolones Counseling:  I discussed with the patient the risks of fluoroquinolones including but not limited to GI upset, allergic reaction, drug rash, diarrhea, dizziness, photosensitivity, yeast infections, liver function test abnormalities, tendonitis/tendon rupture. Dapsone Pregnancy And Lactation Text: This medication is Pregnancy Category C and is not considered safe during pregnancy or breast feeding. Cosentyx Counseling:  I discussed with the patient the risks of Cosentyx including but not limited to worsening of Crohn's disease, immunosuppression, allergic reactions and infections.  The patient understands that monitoring is required including a PPD at baseline and must alert us or the primary physician if symptoms of infection or other concerning signs are noted. Cyclophosphamide Pregnancy And Lactation Text: This medication is Pregnancy Category D and it isn't considered safe during pregnancy. This medication is excreted in breast milk. Topical Retinoid counseling:  Patient advised to apply a pea-sized amount only at bedtime and wait 30 minutes after washing their face before applying.  If too drying, patient may add a non-comedogenic moisturizer. The patient verbalized understanding of the proper use and possible adverse effects of retinoids.  All of the patient's questions and concerns were addressed. Drysol Pregnancy And Lactation Text: This medication is considered safe during pregnancy and breast feeding. Erivedge Counseling- I discussed with the patient the risks of Erivedge including but not limited to nausea, vomiting, diarrhea, constipation, weight loss, changes in the sense of taste, decreased appetite, muscle spasms, and hair loss.  The patient verbalized understanding of the proper use and possible adverse effects of Erivedge.  All of the patient's questions and concerns were addressed. Spironolactone Counseling: Patient advised regarding risks of diarrhea, abdominal pain, hyperkalemia, birth defects (for female patients), liver toxicity and renal toxicity. The patient may need blood work to monitor liver and kidney function and potassium levels while on therapy. The patient verbalized understanding of the proper use and possible adverse effects of spironolactone.  All of the patient's questions and concerns were addressed. Cyclosporine Counseling:  I discussed with the patient the risks of cyclosporine including but not limited to hypertension, gingival hyperplasia,myelosuppression, immunosuppression, liver damage, kidney damage, neurotoxicity, lymphoma, and serious infections. The patient understands that monitoring is required including baseline blood pressure, CBC, CMP, lipid panel and uric acid, and then 1-2 times monthly CMP and blood pressure. Rifampin Counseling: I discussed with the patient the risks of rifampin including but not limited to liver damage, kidney damage, red-orange body fluids, nausea/vomiting and severe allergy. Spironolactone Pregnancy And Lactation Text: This medication can cause feminization of the male fetus and should be avoided during pregnancy. The active metabolite is also found in breast milk. Cimetidine Counseling:  I discussed with the patient the risks of Cimetidine including but not limited to gynecomastia, headache, diarrhea, nausea, drowsiness, arrhythmias, pancreatitis, skin rashes, psychosis, bone marrow suppression and kidney toxicity. Dupixent Counseling: I discussed with the patient the risks of dupilumab including but not limited to eye infection and irritation, cold sores, injection site reactions, worsening of asthma, allergic reactions and increased risk of parasitic infection.  Live vaccines should be avoided while taking dupilumab. Dupilumab will also interact with certain medications such as warfarin and cyclosporine. The patient understands that monitoring is required and they must alert us or the primary physician if symptoms of infection or other concerning signs are noted. Elidel Counseling: Patient may experience a mild burning sensation during topical application. Elidel is not approved in children less than 2 years of age. There have been case reports of hematologic and skin malignancies in patients using topical calcineurin inhibitors although causality is questionable. Azithromycin Counseling:  I discussed with the patient the risks of azithromycin including but not limited to GI upset, allergic reaction, drug rash, diarrhea, and yeast infections. Tazorac Counseling:  Patient advised that medication is irritating and drying.  Patient may need to apply sparingly and wash off after an hour before eventually leaving it on overnight.  The patient verbalized understanding of the proper use and possible adverse effects of tazorac.  All of the patient's questions and concerns were addressed. SSKI Counseling:  I discussed with the patient the risks of SSKI including but not limited to thyroid abnormalities, metallic taste, GI upset, fever, headache, acne, arthralgias, paraesthesias, lymphadenopathy, easy bleeding, arrhythmias, and allergic reaction. Stelara Counseling:  I discussed with the patient the risks of ustekinumab including but not limited to immunosuppression, malignancy, posterior leukoencephalopathy syndrome, and serious infections.  The patient understands that monitoring is required including a PPD at baseline and must alert us or the primary physician if symptoms of infection or other concerning signs are noted. Rifampin Pregnancy And Lactation Text: This medication is Pregnancy Category C and it isn't know if it is safe during pregnancy. It is also excreted in breast milk and should not be used if you are breast feeding. Taltz Counseling: I discussed with the patient the risks of ixekizumab including but not limited to immunosuppression, serious infections, worsening of inflammatory bowel disease and drug reactions.  The patient understands that monitoring is required including a PPD at baseline and must alert us or the primary physician if symptoms of infection or other concerning signs are noted. Tazorac Pregnancy And Lactation Text: This medication is not safe during pregnancy. It is unknown if this medication is excreted in breast milk. Methotrexate Pregnancy And Lactation Text: This medication is Pregnancy Category X and is known to cause fetal harm. This medication is excreted in breast milk. Gabapentin Counseling: I discussed with the patient the risks of gabapentin including but not limited to dizziness, somnolence, fatigue and ataxia. Azithromycin Pregnancy And Lactation Text: This medication is considered safe during pregnancy and is also secreted in breast milk. Topical Clindamycin Counseling: Patient counseled that this medication may cause skin irritation or allergic reactions.  In the event of skin irritation, the patient was advised to reduce the amount of the drug applied or use it less frequently.   The patient verbalized understanding of the proper use and possible adverse effects of clindamycin.  All of the patient's questions and concerns were addressed. Methotrexate Counseling:  Patient counseled regarding adverse effects of methotrexate including but not limited to nausea, vomiting, abnormalities in liver function tests. Patients may develop mouth sores, rash, diarrhea, and abnormalities in blood counts. The patient understands that monitoring is required including LFT's and blood counts.  There is a rare possibility of scarring of the liver and lung problems that can occur when taking methotrexate. Persistent nausea, loss of appetite, pale stools, dark urine, cough, and shortness of breath should be reported immediately. Patient advised to discontinue methotrexate treatment at least three months before attempting to become pregnant.  I discussed the need for folate supplements while taking methotrexate.  These supplements can decrease side effects during methotrexate treatment. The patient verbalized understanding of the proper use and possible adverse effects of methotrexate.  All of the patient's questions and concerns were addressed. Bactrim Counseling:  I discussed with the patient the risks of sulfa antibiotics including but not limited to GI upset, allergic reaction, drug rash, diarrhea, dizziness, photosensitivity, and yeast infections.  Rarely, more serious reactions can occur including but not limited to aplastic anemia, agranulocytosis, methemoglobinemia, blood dyscrasias, liver or kidney failure, lung infiltrates or desquamative/blistering drug rashes. Dupixent Pregnancy And Lactation Text: This medication likely crosses the placenta but the risk for the fetus is uncertain. This medication is excreted in breast milk. Doxepin Counseling:  Patient advised that the medication is sedating and not to drive a car after taking this medication. Patient informed of potential adverse effects including but not limited to dry mouth, urinary retention, and blurry vision.  The patient verbalized understanding of the proper use and possible adverse effects of doxepin.  All of the patient's questions and concerns were addressed. Sarecycline Counseling: Patient advised regarding possible photosensitivity and discoloration of the teeth, skin, lips, tongue and gums.  Patient instructed to avoid sunlight, if possible.  When exposed to sunlight, patients should wear protective clothing, sunglasses, and sunscreen.  The patient was instructed to call the office immediately if the following severe adverse effects occur:  hearing changes, easy bruising/bleeding, severe headache, or vision changes.  The patient verbalized understanding of the proper use and possible adverse effects of sarecycline.  All of the patient's questions and concerns were addressed. Sski Pregnancy And Lactation Text: This medication is Pregnancy Category D and isn't considered safe during pregnancy. It is excreted in breast milk.

## 2023-05-21 NOTE — ED PROVIDER NOTE - CARE PLAN
1 Principal Discharge DX:	Acute respiratory failure with hypoxia  Secondary Diagnosis:	BONNY (acute kidney injury)  Secondary Diagnosis:	Elevated LFTs  Secondary Diagnosis:	Hyperkalemia  Secondary Diagnosis:	Leukocytosis  Secondary Diagnosis:	Lactic acidosis

## 2023-05-21 NOTE — PROVIDER CONTACT NOTE (EICU) - ASSESSMENT
81 yo M with acute mixed resp failure, requiring vent support; COPD exacerbation.  Shock - distributive (infection?, sedative meds), concern for cardiac; obstructive component (PE) is also on differential but reportedly no signs of of RV strain on bedside sono.  Multiorgan failure - BONNY (vs worsening CKD), liver injury (?shock liver) due to hypoxemia.  Mixed resp and HAGM acidosis (lactic + renal failure).  Multiple comorbidities, incl . COPD on home O2, pHTN, CAD/stents/CHF, Afib, CKD3.

## 2023-05-21 NOTE — ED ADULT TRIAGE NOTE - CHIEF COMPLAINT QUOTE
biba from home respiratory distress cyanotic unable to speak end stage copd/emphysema and chf epi 0.3 im decadron 10 mg ivp cpap placed without improvement noted proceeded to etomidate 20 mg ivp and intubated 7.5 ett 21 cm at lipline during placement confirmation and ett securement noted with ett dislodgement etomidate 20mg ivp and rocronium 70mg ivp given with 7.5 ett placed 24 cm at lipline etco2 + color change

## 2023-05-21 NOTE — H&P ADULT - NSHPPHYSICALEXAM_GEN_ALL_CORE
GENERAL: intubated and sedated   EYES: PERRL, conjunctiva and sclera clear  NECK: Supple, trachea midline, no JVD  HEART: Regular rate and rhythm  LUNGS: poor BS bilat  no crackles, wheezing, or rhonchi  ABDOMEN: Soft, nontender, nondistended  EXTREMITIES: cool extremities. no edema. +clubbing   NERVOUS SYSTEM:  intubated and sedated and recently paralyzed after intubation

## 2023-05-21 NOTE — ED PROVIDER NOTE - PHYSICAL EXAMINATION
PHYSICAL EXAM:    GENERAL: appears stated age, intubated  SKIN: warm  HEAD: NC, AT  EYE: Normal lids/conjunctiva  ENT: Normal hearing, patent oropharynx   NECK: +supple. No meningismus  Pulm: Bilateral BS, intubated no crackles  CV: RRR, no M/R/G, 2+and = radial pulses  Abd: soft, non-tender, non-distended  Mskel: no erythema, cyanosis, edema. no calf tenderness  Neuro: intubated

## 2023-05-21 NOTE — ED ADULT NURSE NOTE - OBJECTIVE STATEMENT
Patient was biba from home due to respiratory distress, noted cyanotic. Unresponsive with pmhx end stage copd/emphysema and CHF. As per wife pt has a ongoing hx with problems breathing, uses o2 therapy 3L while at home.  Patient started having sob since yesterday that was more that usual and today stated that it was too much for him and asked to be taken to the hospital. As per wife pt walked from chair to stretcher and then became very hypoxia, pt was intubated while in ambulance and re-intubated while in ED due to dislodgment of tube.

## 2023-05-21 NOTE — DISCHARGE NOTE FOR THE EXPIRED PATIENT - HOSPITAL COURSE
82 year old male with PMHx of end stage COPD on 3-4LNC at home and CPAP at night who regularly desats to 80s with exertion. Has history of CHF (on diuretics), s/p CABG, CAD with stents (2022), PHTN, AFIB, CKD stage 3. He presented to EMS at the house complaining of respiratory distress with difficulty managing his airway due to  mucous with more mucous production than usual.  Note the wife reported to EMS he wasnt having any other infectious sx, did not have any worsening cough, chest pain, abd pain, n/v, fevers/rigors/chills, and no sick contacts or recent travel or any toxic/inhalant exposures.    When EMS arrived, patient was SOB but walked into the ambulance. In the ambulance patient was hypoxic and SOB worsened and patient was intubated in the ambulance. Opon arrival to ER he was afebrile and originally HD stable with no detectable O2 sat.  Apparently in the ER he self extubated and  was immediately  reintubated. The initial  ABG showed severe metabolic acidosis with pH 6.96/61/200 and lactate of 12. Repeat ABG after reintubation minimally improved with 6.98/76/126 and lactate of 9.9. BMP also with K 6.3 and BONNY and wbc of 18. After intubation, pt became acutely hypotensive requiring a full stick of Remi and started on Levo. Pt given 1L NS, zosyn, and given 2 amps of bicarb and admitted to ICU but experenced cardiac arrest in the ER proiro to transffere upstairs which after 18 mins efforts were stopped.     I examined the patient and found PEA on monitor, no palpable pulses at carotid and femoral locations, the ventilator was disconnected, no spontaneous breath sounds were auscultated via stethoscope. There were no heart sounds auscultated via stethoscope at left and right sternal boarders as well at PMI, POCUS was performed by myself finding no evidence of ventricular contraction in parasternal long/short or subxiphod views .The  skin was cyanotic and cool and pupils were fixed and dilated without reaction to light and she was pronounced dead at 20:46 with the family (  Wife son and daughter ) at the bedside  82 year old male with PMHx of end stage COPD on 3-4LNC at home and CPAP at night who regularly desats to 80s with exertion. Has history of CHF (on diuretics), s/p CABG, CAD with stents (2022), PHTN, AFIB, CKD stage 3. He presented to EMS at the house complaining of respiratory distress with difficulty managing his airway due to  mucous with more mucous production than usual.  Note the wife reported to EMS he wasnt having any other infectious sx, did not have any worsening cough, chest pain, abd pain, n/v, fevers/rigors/chills, and no sick contacts or recent travel or any toxic/inhalant exposures.    When EMS arrived, patient was SOB but walked into the ambulance. In the ambulance patient was hypoxic and SOB worsened and patient was intubated in the ambulance. Opon arrival to ER he was afebrile and originally HD stable with no detectable O2 sat.  Apparently in the ER he self extubated and  was immediately  reintubated. The initial  ABG showed severe metabolic acidosis with pH 6.96/61/200 and lactate of 12. Repeat ABG after reintubation minimally improved with 6.98/76/126 and lactate of 9.9. BMP also with K 6.3 and BONNY and wbc of 18. After intubation, pt became acutely hypotensive requiring a full stick of Remi and started on Levo. Pt given 1L NS, zosyn, and given 2 amps of bicarb and admitted to ICU but experenced cardiac arrest in the ER proiro to transffere upstairs which after 18 mins efforts were stopped.     Case was disscused with the ME name Laura Saleh who is not taking the case     I examined the patient and found PEA on monitor, no palpable pulses at carotid and femoral locations, the ventilator was disconnected, no spontaneous breath sounds were auscultated via stethoscope. There were no heart sounds auscultated via stethoscope at left and right sternal boarders as well at PMI, POCUS was performed by myself finding no evidence of ventricular contraction in parasternal long/short or subxiphod views .The  skin was cyanotic and cool and pupils were fixed and dilated without reaction to light and she was pronounced dead at 20:46 with the family (  Wife son and daughter ) at the bedside  82 year old male with PMHx of end stage COPD on 3-4LNC at home and CPAP at night who regularly desats to 80s with exertion. Has history of CHF (on diuretics), s/p CABG, CAD with stents (2022), PHTN, AFIB, CKD stage 3. He presented to EMS at the house complaining of respiratory distress with difficulty managing his airway due to  mucous with more mucous production than usual.  Note the wife reported to EMS he wasnt having any other infectious sx, did not have any worsening cough, chest pain, abd pain, n/v, fevers/rigors/chills, and no sick contacts or recent travel or any toxic/inhalant exposures.    When EMS arrived, patient was SOB but walked into the ambulance. In the ambulance patient was hypoxic and SOB worsened and patient was intubated in the ambulance. Upon arrival to ER he was afebrile and originally HD stable with no detectable O2 sat.  Apparently in the ER he self extubated and  was immediately  reintubated. The initial  ABG showed severe metabolic acidosis with pH 6.96/61/200 and lactate of 12. Repeat ABG after reintubation minimally improved with 6.98/76/126 and lactate of 9.9. BMP also with K 6.3 and BONNY and wbc of 18. After intubation, pt became acutely hypotensive requiring a full stick of Remi and started on Levo. Pt given 1L NS, zosyn, and given 2 amps of bicarb and admitted to ICU but experenced cardiac arrest in the ER proiro to transffere upstairs which after 18 mins efforts were stopped.     Case was disscused with the ME name Laura Saleh who is not taking the case     I examined the patient and found PEA on monitor, no palpable pulses at carotid and femoral locations, the ventilator was disconnected, no spontaneous breath sounds were auscultated via stethoscope. There were no heart sounds auscultated via stethoscope at left and right sternal boarders as well at PMI, POCUS was performed by myself finding no evidence of ventricular contraction in parasternal long/short or subxiphod views.  The skin was cyanotic and cool and pupils were fixed and dilated without reaction to light and he was pronounced dead at 20:46 with the family (Wife son and daughter) at the bedside

## 2023-05-21 NOTE — CHART NOTE - NSCHARTNOTEFT_GEN_A_CORE
CODE BLUE NOTE       HPI; 82 year old male with PMHx of end stage COPD on 3-4LNC at home and CPAP at night who regularly desats to 80s with exertion. Has history of CHF (on diuretics), s/p CABG, CAD with stents (2022), PHTN, AFIB, CKD stage 3. He presented to EMS at the house complaining of respiratory distress with difficulty managing his airway due to  mucous with more mucous production than usual.  Note the wife reported to EMS he wasnt having any other infectious sx, did not have any worsening cough, chest pain, abd pain, n/v, fevers/rigors/chills, and no sick contacts or recent travel or any toxic/inhalant exposures.    When EMS arrived, patient was SOB but walked into the ambulance. In the ambulance patient was hypoxic and SOB worsened and patient was intubated in the ambulance. Opon arrival to ER he was afebrile and originally HD stable with no detectable O2 sat.  Apparently in the ER he self extubated and  was immediately  reintubated. The initial  ABG showed severe metabolic acidosis with pH 6.96/61/200 and lactate of 12. Repeat ABG after reintubation minimally improved with 6.98/76/126 and lactate of 9.9. BMP also with K 6.3 and BONNY and wbc of 18. After intubation, pt became acutely hypotensive requiring a full stick of Remi and started on Levo. Pt given 1L NS, zosyn, shifted for hyperK and given 2 amps of bicarb.     In the process of transporting to ICU he became bradycardic and hypotensive, my arrival was at that moment when transfer was stopped in the ER, he was in the process of receiving another Amp of Bicarb when he changed his QRS morphology and pulses were checked finding no palpable pulses in femoral of carotids indicating PEA. CPR was established immediately the ventilator was disconnected and the Pt was ventilated with BVM at 100% FIo2. 4 rounds of CPR with ACLS meds including epi, calcium and exogenous Bicarb were administered over 8 mins and ROSC was obtained for about 1 min only to loose spontaneous pulses again and CPR was reestablished. A shockable rhythm was obtained in for of VFib at the next pulse check and defibrillation at 200J was performed , re-check of pulses found PEA again and CPR was continued with CODE BLUE NOTE       HPI; 82 year old male with PMHx of end stage COPD on 3-4LNC at home and CPAP at night who regularly desats to 80s with exertion. Has history of CHF (on diuretics), s/p CABG, CAD with stents (2022), PHTN, AFIB, CKD stage 3. He presented to EMS at the house complaining of respiratory distress with difficulty managing his airway due to  mucous with more mucous production than usual.  Note the wife reported to EMS he wasnt having any other infectious sx, did not have any worsening cough, chest pain, abd pain, n/v, fevers/rigors/chills, and no sick contacts or recent travel or any toxic/inhalant exposures.    When EMS arrived, patient was SOB but walked into the ambulance. In the ambulance patient was hypoxic and SOB worsened and patient was intubated in the ambulance. Opon arrival to ER he was afebrile and originally HD stable with no detectable O2 sat.  Apparently in the ER he self extubated and  was immediately  reintubated. The initial  ABG showed severe metabolic acidosis with pH 6.96/61/200 and lactate of 12. Repeat ABG after reintubation minimally improved with 6.98/76/126 and lactate of 9.9. BMP also with K 6.3 and BONNY and wbc of 18. After intubation, pt became acutely hypotensive requiring a full stick of Remi and started on Levo. Pt given 1L NS, zosyn, and given 2 amps of bicarb.     In the process of transporting to ICU he became bradycardic and hypotensive, my arrival was at that moment when transfer was stopped in the ER, he was in the process of receiving another Amp of Bicarb when he changed his QRS morphology and pulses were checked ( at 20;28) finding no palpable pulses in femoral of carotids indicating PEA. CPR was established immediately the ventilator was disconnected and the Pt was ventilated with BVM at 100% FIo2. 4 rounds of CPR with ACLS meds including epi, calcium and exogenous Bicarb were administered over 8 mins and ROSC was obtained for about 1 min only to loose spontaneous pulses again and CPR was reestablished. A shockable rhythm was obtained in for of VFib at the next pulse check and defibrillation at 200J was performed , re-check of pulses found PEA again and CPR was continued with ACLS meds given. Early in the code blue the decision  was made to administer TPa to treat the possibility of PE but was not given due to the prescience of pulmonary alveloar hemorrhage. Pulse check performed at the 18 min callie of the resuscitative  efforts found no spontaneous pulses and POCUS shown no cardiac motion in apical view and with the son at bedside he requested the efforts be stopped.     I examined the patient and found PEA on monitor, no palpable pulses at carotid and femoral locations, the ventilator was disconnected, no spontaneous breath sounds were auscultated via stethoscope. There were no heart sounds auscultated via stethoscope at left and right sternal boarders as well at PMI, POCUS was performed by myself finding no evidence of ventricular contraction in parasternal long and apical four chamber view .The skin was cyanotic and cool and pupils were fixed and dilated without reaction to light and he was pronounced dead 20:46    Critical Care time: 35 mins assessing presenting problems of acute illness that poses high probability of life threatening deterioration or end organ damage/dysfunction.  Medical decision making including Initiating plan of care, reviewing data, reviewing radiology, direct patient bedside evaluation and interpretation of vital signs, any necessary ventilator management , discussion with multidisciplinary team, discussing goals of care with patient/family, all non inclusive of procedures, COVID 19 specific considerations and therapeutic  options based on the available and rapidly changing literature

## 2023-05-21 NOTE — PROVIDER CONTACT NOTE (EICU) - SITUATION
81 yo M with multiple comorbidities, incl . COPD on home O2, pHTN, CAD/stents/CHF, Afib, CKD3.  Presented with acute hypoxic resp failure requiring intubation en route to the hospital; noted hypoxic on arrival, had to be re-intubated. Clinically in shock, with refractory hypercapnea, labs with signs of multiorgan failure.

## 2023-05-21 NOTE — H&P ADULT - HISTORY OF PRESENT ILLNESS
Pt is 81yo M hx COPD on 3-4LNC at home and CPAP at night (desats to 80s when ambulates), CHF (on diuretics), s/p CABG, CAD with stents (2022), pulm htn, afib, CKD stage 3 presents to ED with respiratory failure. As per wife at bedside, pt was in usual state of health yesterday. This AM woke up and developed acute onset SOB with difficulty handling his mucous with more mucous production than usual. Pt then lied down and asked to call 911. Wife says he wasnt having any other infectious sx, did not have any worsening cough, chest pain, abd pain, n/v, fevers/rigors/chills, and no sick contacts or recent travel or any toxic/inhalant exposures.   When EMS arrived, patient was SOB but walked into the ambulance. In the ambulance patient was hypoxic and SOB worsened and patient was intubated in the ambulance. In the ED, pt was afebrile and originally HD stable with no detectable O2 sat. ETT then "came out" and pt was reintubated in the ED. Original ABG with severe metabolic acidosis with pH 6.96/61/200 and lactate of 12. Repeat ABG after reintubation minimally improved with 6.98/76/126 and lactate of 9.9. BMP also with K 6.3 and BONNY and wbc of 18. After intubation, pt became acutely hypotensive requiring a full stick of Remi and started on Levo. Pt given 1L NS, zosyn,  Pt is 83yo M hx COPD on 3-4LNC at home and CPAP at night (desats to 80s when ambulates), CHF (on diuretics), s/p CABG, CAD with stents (2022), pulm htn, afib, CKD stage 3 presents to ED with respiratory failure. As per wife at bedside, pt was in usual state of health yesterday. This AM woke up and developed acute onset SOB with difficulty handling his mucous with more mucous production than usual. Pt then lied down and asked to call 911. Wife says he wasnt having any other infectious sx, did not have any worsening cough, chest pain, abd pain, n/v, fevers/rigors/chills, and no sick contacts or recent travel or any toxic/inhalant exposures.   When EMS arrived, patient was SOB but walked into the ambulance. In the ambulance patient was hypoxic and SOB worsened and patient was intubated in the ambulance. In the ED, pt was afebrile and originally HD stable with no detectable O2 sat. ETT then "came out" and pt was reintubated in the ED. Original ABG with severe metabolic acidosis with pH 6.96/61/200 and lactate of 12. Repeat ABG after reintubation minimally improved with 6.98/76/126 and lactate of 9.9. BMP also with K 6.3 and BONNY and wbc of 18. After intubation, pt became acutely hypotensive requiring a full stick of Remi and started on Levo. Pt given 1L NS, zosyn, shifted for hyperK and given 2 amps of bicarb.     Subjective; Pt seen and examined at bedside. Pt on 0.2mcg Levo and intubated with Ppeak pressures of 17, plat 11, and no autoPEEP. Sat on monitor reading 67% at time ABG drawn with PaO2 of 126 and portable vital cart showing sat of 92%.

## 2023-05-21 NOTE — ED ADULT NURSE NOTE - NSFALLRISKINTERV_ED_ALL_ED

## 2023-05-22 PROBLEM — J44.1 CHRONIC OBSTRUCTIVE PULMONARY DISEASE WITH (ACUTE) EXACERBATION: Chronic | Status: ACTIVE | Noted: 2022-01-01

## 2023-05-22 PROBLEM — I50.9 HEART FAILURE, UNSPECIFIED: Chronic | Status: ACTIVE | Noted: 2022-01-01

## 2023-05-22 PROBLEM — E11.9 TYPE 2 DIABETES MELLITUS WITHOUT COMPLICATIONS: Chronic | Status: ACTIVE | Noted: 2022-01-01

## 2023-05-22 PROBLEM — D64.9 ANEMIA, UNSPECIFIED: Chronic | Status: ACTIVE | Noted: 2022-01-01

## 2023-05-22 PROBLEM — I10 ESSENTIAL (PRIMARY) HYPERTENSION: Chronic | Status: ACTIVE | Noted: 2022-01-01

## 2023-05-22 LAB
ANISOCYTOSIS BLD QL: SIGNIFICANT CHANGE UP
BASOPHILS # BLD AUTO: 0 K/UL — SIGNIFICANT CHANGE UP (ref 0–0.2)
BASOPHILS NFR BLD AUTO: 0 % — SIGNIFICANT CHANGE UP (ref 0–2)
BURR CELLS BLD QL SMEAR: SLIGHT — SIGNIFICANT CHANGE UP
CULTURE RESULTS: NO GROWTH — SIGNIFICANT CHANGE UP
EOSINOPHIL # BLD AUTO: 0 K/UL — SIGNIFICANT CHANGE UP (ref 0–0.5)
EOSINOPHIL NFR BLD AUTO: 0 % — SIGNIFICANT CHANGE UP (ref 0–6)
LEGIONELLA AG UR QL: NEGATIVE — SIGNIFICANT CHANGE UP
LG PLATELETS BLD QL AUTO: SLIGHT — SIGNIFICANT CHANGE UP
LYMPHOCYTES # BLD AUTO: 0.93 K/UL — LOW (ref 1–3.3)
LYMPHOCYTES # BLD AUTO: 5 % — LOW (ref 13–44)
MACROCYTES BLD QL: SIGNIFICANT CHANGE UP
MANUAL DIF COMMENT BLD-IMP: SIGNIFICANT CHANGE UP
MANUAL SMEAR VERIFICATION: SIGNIFICANT CHANGE UP
METAMYELOCYTES # FLD: 1 % — HIGH (ref 0–0)
MONOCYTES # BLD AUTO: 2.97 K/UL — HIGH (ref 0–0.9)
MONOCYTES NFR BLD AUTO: 16 % — HIGH (ref 2–14)
MYELOCYTES NFR BLD: 3 % — HIGH (ref 0–0)
NEUTROPHILS # BLD AUTO: 11.5 K/UL — HIGH (ref 1.8–7.4)
NEUTROPHILS NFR BLD AUTO: 60 % — SIGNIFICANT CHANGE UP (ref 43–77)
NEUTS BAND # BLD: 2 % — SIGNIFICANT CHANGE UP (ref 0–8)
NRBC # BLD: 0 /100 — SIGNIFICANT CHANGE UP (ref 0–0)
NRBC # BLD: SIGNIFICANT CHANGE UP /100 WBCS (ref 0–0)
OVALOCYTES BLD QL SMEAR: SLIGHT — SIGNIFICANT CHANGE UP
PLAT MORPH BLD: NORMAL — SIGNIFICANT CHANGE UP
PLATELET CLUMP BLD QL SMEAR: ABNORMAL
PLATELET COUNT - ESTIMATE: NORMAL — SIGNIFICANT CHANGE UP
POIKILOCYTOSIS BLD QL AUTO: SIGNIFICANT CHANGE UP
PROMYELOCYTES # FLD: 10 % — HIGH (ref 0–0)
RBC BLD AUTO: SIGNIFICANT CHANGE UP
SCHISTOCYTES BLD QL AUTO: SLIGHT — SIGNIFICANT CHANGE UP
SMUDGE CELLS # BLD: PRESENT — SIGNIFICANT CHANGE UP
SPECIMEN SOURCE: SIGNIFICANT CHANGE UP
TARGETS BLD QL SMEAR: SLIGHT — SIGNIFICANT CHANGE UP
VARIANT LYMPHS # BLD: 3 % — SIGNIFICANT CHANGE UP (ref 0–6)

## 2023-05-23 DIAGNOSIS — N18.30 CHRONIC KIDNEY DISEASE, STAGE 3 UNSPECIFIED: ICD-10-CM

## 2023-05-23 DIAGNOSIS — N17.9 ACUTE KIDNEY FAILURE, UNSPECIFIED: ICD-10-CM

## 2023-05-23 DIAGNOSIS — R04.89 HEMORRHAGE FROM OTHER SITES IN RESPIRATORY PASSAGES: ICD-10-CM

## 2023-05-23 DIAGNOSIS — Z99.81 DEPENDENCE ON SUPPLEMENTAL OXYGEN: ICD-10-CM

## 2023-05-23 DIAGNOSIS — J44.9 CHRONIC OBSTRUCTIVE PULMONARY DISEASE, UNSPECIFIED: ICD-10-CM

## 2023-05-23 DIAGNOSIS — I13.0 HYPERTENSIVE HEART AND CHRONIC KIDNEY DISEASE WITH HEART FAILURE AND STAGE 1 THROUGH STAGE 4 CHRONIC KIDNEY DISEASE, OR UNSPECIFIED CHRONIC KIDNEY DISEASE: ICD-10-CM

## 2023-05-23 DIAGNOSIS — I50.9 HEART FAILURE, UNSPECIFIED: ICD-10-CM

## 2023-05-23 DIAGNOSIS — I46.9 CARDIAC ARREST, CAUSE UNSPECIFIED: ICD-10-CM

## 2023-05-23 DIAGNOSIS — E87.5 HYPERKALEMIA: ICD-10-CM

## 2023-05-23 DIAGNOSIS — J18.9 PNEUMONIA, UNSPECIFIED ORGANISM: ICD-10-CM

## 2023-05-23 DIAGNOSIS — J96.01 ACUTE RESPIRATORY FAILURE WITH HYPOXIA: ICD-10-CM

## 2023-05-23 DIAGNOSIS — I45.10 UNSPECIFIED RIGHT BUNDLE-BRANCH BLOCK: ICD-10-CM

## 2023-05-23 DIAGNOSIS — A41.9 SEPSIS, UNSPECIFIED ORGANISM: ICD-10-CM

## 2023-05-23 DIAGNOSIS — Z95.5 PRESENCE OF CORONARY ANGIOPLASTY IMPLANT AND GRAFT: ICD-10-CM

## 2023-05-23 DIAGNOSIS — E87.20 ACIDOSIS, UNSPECIFIED: ICD-10-CM

## 2023-05-23 DIAGNOSIS — R57.8 OTHER SHOCK: ICD-10-CM

## 2023-05-23 DIAGNOSIS — I25.10 ATHEROSCLEROTIC HEART DISEASE OF NATIVE CORONARY ARTERY WITHOUT ANGINA PECTORIS: ICD-10-CM

## 2023-05-23 DIAGNOSIS — I49.01 VENTRICULAR FIBRILLATION: ICD-10-CM

## 2023-05-23 DIAGNOSIS — I27.20 PULMONARY HYPERTENSION, UNSPECIFIED: ICD-10-CM

## 2023-05-23 DIAGNOSIS — R74.01 ELEVATION OF LEVELS OF LIVER TRANSAMINASE LEVELS: ICD-10-CM

## 2023-05-23 DIAGNOSIS — Z95.1 PRESENCE OF AORTOCORONARY BYPASS GRAFT: ICD-10-CM

## 2023-05-23 DIAGNOSIS — I48.91 UNSPECIFIED ATRIAL FIBRILLATION: ICD-10-CM

## 2023-05-26 LAB
CULTURE RESULTS: SIGNIFICANT CHANGE UP
CULTURE RESULTS: SIGNIFICANT CHANGE UP
SPECIMEN SOURCE: SIGNIFICANT CHANGE UP
SPECIMEN SOURCE: SIGNIFICANT CHANGE UP

## 2024-10-21 NOTE — ED ADULT NURSE NOTE - NSSUHOSCREENINGYN_ED_ALL_ED
Rx Refill Note  Requested Prescriptions     Pending Prescriptions Disp Refills    atorvastatin (LIPITOR) 80 MG tablet [Pharmacy Med Name: ATORVASTATIN 80MG TABLETS] 90 tablet 0     Sig: TAKE 1 TABLET BY MOUTH EVERY NIGHT      Last office visit with prescribing clinician: 7/3/2024     Next office visit with prescribing clinician: 12/4/2024     Kath Sprague MA  10/21/24, 09:00 EDT   
No - the patient is unable to be screened due to medical condition